# Patient Record
Sex: MALE | Race: BLACK OR AFRICAN AMERICAN | NOT HISPANIC OR LATINO | Employment: FULL TIME | URBAN - METROPOLITAN AREA
[De-identification: names, ages, dates, MRNs, and addresses within clinical notes are randomized per-mention and may not be internally consistent; named-entity substitution may affect disease eponyms.]

---

## 2018-01-05 ENCOUNTER — ALLSCRIPTS OFFICE VISIT (OUTPATIENT)
Dept: OTHER | Facility: OTHER | Age: 53
End: 2018-01-05

## 2018-01-05 DIAGNOSIS — M75.00 ADHESIVE CAPSULITIS OF SHOULDER: ICD-10-CM

## 2018-01-05 DIAGNOSIS — Z12.5 ENCOUNTER FOR SCREENING FOR MALIGNANT NEOPLASM OF PROSTATE: ICD-10-CM

## 2018-01-05 DIAGNOSIS — M25.519 PAIN IN SHOULDER: ICD-10-CM

## 2018-01-12 NOTE — MISCELLANEOUS
Provider Comments   Patient a no show for 02/08/16 OV; mailbox is full and not accepting messages at this time   C Ben        Signatures   Electronically signed by : Francisca Vyas DO; Feb 9 2016  5:06PM EST

## 2018-01-16 ENCOUNTER — TRANSCRIBE ORDERS (OUTPATIENT)
Dept: ADMINISTRATIVE | Facility: HOSPITAL | Age: 53
End: 2018-01-16

## 2018-01-16 ENCOUNTER — APPOINTMENT (OUTPATIENT)
Dept: RADIOLOGY | Facility: CLINIC | Age: 53
End: 2018-01-16
Payer: COMMERCIAL

## 2018-01-16 ENCOUNTER — ALLSCRIPTS OFFICE VISIT (OUTPATIENT)
Dept: OTHER | Facility: OTHER | Age: 53
End: 2018-01-16

## 2018-01-16 DIAGNOSIS — M25.519 PAIN IN SHOULDER: ICD-10-CM

## 2018-01-16 DIAGNOSIS — R29.898 DEFICIENCIES OF LIMBS: Primary | ICD-10-CM

## 2018-01-16 PROCEDURE — 73030 X-RAY EXAM OF SHOULDER: CPT

## 2018-01-17 NOTE — PROGRESS NOTES
Assessment   1  Shoulder weakness (719 61) (R26 552)    Plan   Shoulder pain    · * XR SHOULDER 2+ VIEW RIGHT; Status:Active; Requested GQW:[id number]; Shoulder weakness    · * MRI SHOULDER RIGHT WO CONTRAST; Status:Need Information - Financial    Authorization; Requested [id number];    · EMG ONE EXTREMITY WITH OR W/O RELATED PARASPINAL AREAS; Status:Hold For    - Scheduling; Requested EZR:[id number];   ONE : RUE   · Follow-up After MRI Evaluation and Treatment  Follow-up  Status: Hold For - Scheduling     Requested for: 35UMH1107    Discussion/Summary      Findings consistent with right shoulder weakness, possible suprascapular nerve injury versus rotator cuff tear  Discussed findings and treatment options with the patient  I recommended patient to have a EMG and MRI of his right shoulder to better assess his shoulder nerve function and rotator cuff  I recommend patient to continue passive shoulder range of motion as I have instructed  I will make further treatment recommendations after his EMG and MRI  All patient's questions were answered to his satisfaction  This note is created using dictation transcription  It may contains topographical errors, grammatical errors, improperly dictated words, background noise and other errors  Chief Complaint   Right shoulder weakness      History of Present Illness   75-year-old Afro-American male with new onset of right shoulder weakness  Patient's symptoms started early November 2017 while he was doing dead lifts in the gym  He feel discomfort over his right shoulder while he was doing a lift  He did not feel any difficulty moving his right arm after the incident but did have some aching discomfort  He thought that he may have pulled a muscle in his shoulder  The very next day, he noticed decreased strength in his right arm  He has not been able to do any lifting activities due to weakness  He denies pain radiating down his right arm   Information on patient's intake form was reviewed  Review of Systems        Constitutional: No fever or chills, feels well, no tiredness, no recent weight loss or weight gain  Eyes: No complaints of red eyes, no eyesight problems  ENT: no complaints of loss of hearing, no nosebleeds, no sore throat  Cardiovascular: No complaints of chest pain, no palpitations, no leg claudication or lower extremity edema  Respiratory: No complaints of shortness of breath, no wheezing, no cough  Gastrointestinal: No complaints of abdominal pain, no constipation, no nausea or vomiting, no diarrhea or bloody stools  Genitourinary: No complaints of dysuria or incontinence, no hesitancy, no nocturia  Musculoskeletal: as noted in HPI  Integumentary: No complaints of skin rash or lesion, no itching or dry skin, no skin wounds  Neurological: No complaints of headache, no confusion, no numbness or tingling, no dizziness  Psychiatric: No suicidal thoughts, no anxiety, no depression  Endocrine: No muscle weakness, no frequent urination, no excessive thirst, no feelings of weakness  ROS reviewed  Active Problems   1  Allergic rhinitis (477 9) (J30 9)   2  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)   3  Bilateral knee pain (719 46) (M25 561,M25 562)   4  Colon polyps (211 3) (K63 5)   5  Colonoscopy (Fiberoptic) Screening   6  Decreased libido (799 81) (R68 82)   7  Encounter for therapeutic drug monitoring (V58 83) (Z51 81)   8  Erectile dysfunction of non-organic origin (302 72) (F52 21)   9  Fatigue (780 79) (R53 83)   10  Frozen shoulder (726 0) (M75 00)   11  Herpes simplex type 1 infection (054 9) (B00 9)   12  Leukocytosis (288 60) (D72 829)   13  Long term use of drug (V58 69) (Z79 899)   14  Need for prophylactic vaccination and inoculation against influenza (V04 81) (Z23)   15  Prostate cancer screening (V76 44) (Z12 5)   16  Shoulder pain (719 41) (M25 519)   17   Vitamin D deficiency (268 9) (E55 9)   18  Weight loss, non-intentional (783 21) (R63 4)    Past Medical History      The active problems and past medical history were reviewed and updated today  Surgical History      The surgical history was reviewed and updated today  Family History   Mother    · Family history of Brain Cancer (V16 8)   · Family history of depression (V17 0) (Z81 8)   · Family history of Thyroid Cancer  Father    · Family history of Father  At Age ___     The family history was reviewed and updated today  Social History    · Alcohol Use (History)   · Social usage; a case of beer a week   · Drinks caffeinated tea   · Drinks 1-2 cups of hot tea a week   · Never a smoker   · No drug use   · Uses Safety Equipment - Seatbelts  The social history was reviewed and updated today  Current Meds    1  Acyclovir 800 MG Oral Tablet; TAKE 1 TABLET Every twelve hours; Therapy: 13Ddw5314 to (Evaluate:2014)  Requested for: 36TAY6255; Last     Rx:[id number] Ordered   2  Centrum TABS; TAKE 1 TABLET DAILY; Therapy: (Recorded:2014) to Recorded   3  Viagra 100 MG Oral Tablet; TAKE 1/2 TO 1 TABLET 2 HOURS BEFORE INTERCOURSE      USE NO MORE THAN ONCE DAILY; Therapy: 27TBT1331 to (Last Rx:[id number]) Ordered   4  Vitamin D3 5000 UNIT Oral Tablet; One tablet daily; Therapy: 71TIG4454 to (Last Rx:07Epm4324) Ordered     The medication list was reviewed and updated today  Allergies   1  Claritin TABS  2   Other    Vitals    Recorded: [id number] 01:34PM   Heart Rate 88   Systolic 959   Diastolic 78   Height 5 ft 10 in   Weight 171 lb    BMI Calculated 24 54   BSA Calculated 1 95     Physical Exam        Constitutional - General appearance: Normal       Musculoskeletal - Gait and station: Normal       Cardiovascular - Examination of extremities for edema and/or varicosities: Normal       Skin - Skin and subcutaneous tissue: Normal       Neurologic - Sensation: Normal -- Upper extremity peripheral neuro exam: Abnormal -- (Weak abduction and external rotation of the right shoulder  Weak forward flexion)  Psychiatric - Orientation to person, place, and time: Normal -- Mood and affect: Normal       Eyes      Conjunctiva and lids: Normal      Right Shoulder: Appearance: AC joint hypertrophy-- and-- Atrophy over infraspinatus and supraspinatus muscle, but-- no AC joint step-off,-- no deltoid atrophy,-- no trapezius atrophy,-- no belly of the biceps rachana deformity,-- no medial scapular winging,-- no lateral scapular winging-- and-- no shoulder swelling  Tenderness: scapula-- (Medial border), but-- not the [facility] joint,-- not the bicipital groove,-- not the coracoid,-- not the supraspinatus muscle,-- not the trapezial-- and-- not the greater tuberosity  Motor: 3/5 forward flexion,-- 3/5 abduction-- and-- 3/5 external rotation, but-- 5/5 adduction-- and-- 5/5 internal rotation  Forward flexion: painless restricted AROM 130 degrees  Abduction: painless restricted AROM 110 degrees  Full passive range motion  Special Tests: positive Drop Arm test, but-- negative Painful Arc,-- negative Sulcus sign-- and-- negative Cross Body Adduction test       Results/Data   I personally reviewed the films/images/results in the office today  My interpretation follows  X-ray Review Right shoulder show mild acromioclavicular and glenohumeral joint osteoarthritis  No soft tissue calcification  Type 2 acromion process        Future Appointments      Date/Time Provider Specialty Site   02/23/2018 09:20 AM Dina Bingham MD Gastroenterology Adult [facility] 9     Signatures    Electronically signed by : Dacia Matthews MD; Jan 16 2018  2:22PM EST                       (Author)

## 2018-01-23 VITALS
DIASTOLIC BLOOD PRESSURE: 78 MMHG | SYSTOLIC BLOOD PRESSURE: 126 MMHG | BODY MASS INDEX: 24.48 KG/M2 | HEART RATE: 88 BPM | HEIGHT: 70 IN | WEIGHT: 171 LBS

## 2018-01-23 VITALS
HEIGHT: 70 IN | BODY MASS INDEX: 24.67 KG/M2 | WEIGHT: 172.3 LBS | SYSTOLIC BLOOD PRESSURE: 122 MMHG | RESPIRATION RATE: 16 BRPM | HEART RATE: 72 BPM | DIASTOLIC BLOOD PRESSURE: 80 MMHG

## 2018-01-31 VITALS
SYSTOLIC BLOOD PRESSURE: 118 MMHG | HEART RATE: 82 BPM | WEIGHT: 175 LBS | BODY MASS INDEX: 25.05 KG/M2 | HEIGHT: 70 IN | DIASTOLIC BLOOD PRESSURE: 72 MMHG

## 2018-01-31 DIAGNOSIS — M75.111 PARTIAL TEAR OF RIGHT ROTATOR CUFF: Primary | ICD-10-CM

## 2018-01-31 PROCEDURE — 99213 OFFICE O/P EST LOW 20 MIN: CPT | Performed by: ORTHOPAEDIC SURGERY

## 2018-01-31 RX ORDER — SILDENAFIL 100 MG/1
TABLET, FILM COATED ORAL
COMMUNITY
Start: 2014-02-24

## 2018-01-31 RX ORDER — MULTIVITAMIN/IRON/FOLIC ACID 18MG-0.4MG
1 TABLET ORAL DAILY
COMMUNITY

## 2018-01-31 RX ORDER — MAG HYDROX/ALUMINUM HYD/SIMETH 400-400-40
1 SUSPENSION, ORAL (FINAL DOSE FORM) ORAL DAILY
Status: ON HOLD | COMMUNITY
Start: 2013-09-06 | End: 2018-04-13

## 2018-01-31 RX ORDER — ACYCLOVIR 800 MG/1
1 TABLET ORAL EVERY 12 HOURS
COMMUNITY
Start: 2012-02-20 | End: 2018-03-19 | Stop reason: SDUPTHER

## 2018-01-31 RX ORDER — FLUTICASONE PROPIONATE 50 MCG
1 SPRAY, SUSPENSION (ML) NASAL 2 TIMES DAILY
COMMUNITY
Start: 2013-11-08 | End: 2019-05-06 | Stop reason: HOSPADM

## 2018-01-31 NOTE — ASSESSMENT & PLAN NOTE
Findings consistent with right shoulder partial rotator cuff tear  Discussed findings and treatment options with the patient  I think patient's symptom is most likely due to overuse from weight lifting  I recommend patient to attend physical therapy to rehabilitate his right shoulder  I discussed with patient possible surgical treatment if his symptoms persist   I would like to re-evaluate patient in 6-8 weeks  Patient should avoid heavy lifting activities especially overhead  All patient's questions were answered to his satisfaction  This note is created using dictation transcription  It may contains topographical errors, grammatical errors, improperly dictated words, background noise and other errors

## 2018-01-31 NOTE — PROGRESS NOTES
Assessment:     1  Partial tear of right rotator cuff        Plan:     Problem List Items Addressed This Visit        Musculoskeletal and Integument    Partial tear of right rotator cuff - Primary     Findings consistent with right shoulder partial rotator cuff tear  Discussed findings and treatment options with the patient  I think patient's symptom is most likely due to overuse from weight lifting  I recommend patient to attend physical therapy to rehabilitate his right shoulder  I discussed with patient possible surgical treatment if his symptoms persist   I would like to re-evaluate patient in 6-8 weeks  Patient should avoid heavy lifting activities especially overhead  All patient's questions were answered to his satisfaction  This note is created using dictation transcription  It may contains topographical errors, grammatical errors, improperly dictated words, background noise and other errors  Relevant Orders    Ambulatory referral to Physical Therapy         Subjective:     Patient ID: Susi Martin is a 45 y o  male  Chief Complaint:  47-year-old gentleman returns today follow-up right shoulder pain and weakness  Patient returns today after the MRI of his right shoulder  He has been doing some light exercises and feels that the shoulder pain is less he also feels the strength is returning  He has been able to lift light weight below the shoulder level  He is able to actively moving his right shoulder without significant pain  Patient is returned today to review his right shoulder MRI  Allergy:  Allergies   Allergen Reactions    Other Hives     Category: Shrimp; Medications:  all current active meds have been reviewed  Past Medical History:  History reviewed  No pertinent past medical history  Past Surgical History:  History reviewed  No pertinent surgical history    Family History:  Family History   Problem Relation Age of Onset    Thyroid cancer Mother      Social History:  History   Alcohol Use    Yes     History   Drug Use No     History   Smoking Status    Never Smoker   Smokeless Tobacco    Never Used     Review of Systems   Constitutional: Negative  HENT: Negative  Eyes: Negative  Respiratory: Negative  Cardiovascular: Negative  Gastrointestinal: Negative  Endocrine: Negative  Genitourinary: Negative  Musculoskeletal: Positive for arthralgias  Skin: Negative  Neurological: Negative  Hematological: Negative  Psychiatric/Behavioral: Negative  Objective:  BP Readings from Last 1 Encounters:   01/31/18 118/72      Wt Readings from Last 1 Encounters:   01/31/18 79 4 kg (175 lb)      BMI:   Estimated body mass index is 25 11 kg/m² as calculated from the following:    Height as of this encounter: 5' 10" (1 778 m)  Weight as of this encounter: 79 4 kg (175 lb)  BSA:   Estimated body surface area is 1 97 meters squared as calculated from the following:    Height as of this encounter: 5' 10" (1 778 m)  Weight as of this encounter: 79 4 kg (175 lb)  Physical Exam   Constitutional: He is oriented to person, place, and time  He appears well-developed  HENT:   Head: Normocephalic and atraumatic  Eyes: EOM are normal  Pupils are equal, round, and reactive to light  Neck: Neck supple  Neurological: He is alert and oriented to person, place, and time  Skin: Skin is warm  Psychiatric: He has a normal mood and affect  Nursing note and vitals reviewed  Right Shoulder Exam     Tenderness   The patient is experiencing no tenderness  Range of Motion   The patient has normal right shoulder ROM  Muscle Strength   The patient has normal right shoulder strength    Abduction: 4/5   Internal Rotation: 5/5   External Rotation: 4/5   Supraspinatus: 4/5   Subscapularis: 5/5   Biceps: 5/5     Tests   Apprehension: negative  Cross Arm: negative  Drop Arm: negative  Hawkin's test: positive  Impingement: negative  Sulcus: absent    Other   Erythema: absent  Sensation: normal  Pulse: present            Patient's right shoulder MRI on a disc was reviewed  Patient appeared to have a partial rotator cuff tear in the infra and supraspinatus tendon there is also a inferior anterior labrum cyst  acromioclavicular joint osteoarthritis

## 2018-02-07 ENCOUNTER — APPOINTMENT (OUTPATIENT)
Dept: PHYSICAL THERAPY | Facility: CLINIC | Age: 53
End: 2018-02-07
Payer: COMMERCIAL

## 2018-02-12 ENCOUNTER — EVALUATION (OUTPATIENT)
Dept: PHYSICAL THERAPY | Facility: CLINIC | Age: 53
End: 2018-02-12
Payer: COMMERCIAL

## 2018-02-12 DIAGNOSIS — M75.111 PARTIAL TEAR OF RIGHT ROTATOR CUFF: Primary | ICD-10-CM

## 2018-02-12 PROCEDURE — G8984 CARRY CURRENT STATUS: HCPCS

## 2018-02-12 PROCEDURE — G8985 CARRY GOAL STATUS: HCPCS

## 2018-02-12 PROCEDURE — 97161 PT EVAL LOW COMPLEX 20 MIN: CPT

## 2018-02-12 PROCEDURE — 97140 MANUAL THERAPY 1/> REGIONS: CPT

## 2018-02-12 NOTE — PROGRESS NOTES
PT Evaluation     Today's date: 2018  Patient name: Penny Arias  : 1965  MRN: [id number]  Referring provider: Asher Barkley MD  Dx:   Encounter Diagnosis   Name Primary?  Partial tear of right rotator cuff Yes                  Assessment  Impairments: abnormal or restricted ROM and impaired physical strength  Functional limitations: limimted gym routine  Assessment details: Pt is a 52year old LHD male who presents to PT with diagnosis of R shoulder partial RC tear (supra/infra)  He presents with, no focal tenderness, normal range of motion with exception of Ir, moderate posterior capsule tightness, minor strength deficits in R shoulder and periscapular muscles, and decreased functioning with his normal gym routine  He will benefit from skilled PT to help him regain his full mobility, full strength, educate him on proper lifting techniques and stabilization for his upper extremities, and help him return to his normal gym program without restrictions  Thank you kindly for your referral    Understanding of Dx/Px/POC: excellentPrognosis details: Pt has minimal pain symptoms with functional motion and minor strength deficits    Goals  ST: Increase ROM to full ranges in 3-4 weeks  2:  Increase strength to 4+ to 5/5 throughout in 3-4 weeks  3: Pt pain free in all directions  4: Decreased capsular tightness by 50% in 4 weeks  LT: Improve FOTO 10-15 pts in 4-6 weeks  2: Pt able to perform all gym activities without restrictions  3: Pt independent with HEP    Plan  Patient would benefit from: skilled PT  Planned modality interventions: cryotherapy  Planned therapy interventions: neuromuscular re-education, patient education, strengthening, stretching, therapeutic exercise, therapeutic activities, manual therapy and joint mobilization  Frequency: 1x week  Duration in visits: 6  Duration in weeks: 6  Treatment plan discussed with: patient  Plan details: Initiate PT as per POC        Subjective Evaluation    History of Present Illness  Date of onset: 2017  Mechanism of injury: 2017- dead lifts in gym, reps of 8, 6th or 7th rep felt a pull in hamstring on L side and pull on R upper back, leg healed up and R shoulder didn't resolve  Previous football player, L shoulder  LHD  Held off from gym for awhile, recently went back to help strengthen  MRI- partially tear  Pain in posterior cuff when initially injured      Not a recurrent problem Quality of life: good    Pain  No pain reported  Current pain ratin  At best pain ratin  At worst pain ratin  Progression: improved    Social Support  Lives in: apartment  Lives with: alone    Employment status: working ([employer])  Hand dominance: left  Exercise comments: gym 4-5xdays week        Diagnostic Tests  X-ray: normal  MRI studies: abnormal (partial infra-supraspinatus tear, Ac jt arthritis)  Patient Goals  Patient goals for therapy: increased strength and return to sport/leisure activities          Objective     Active Range of Motion   Left Shoulder   Flexion: 145 degrees   Abduction: 160 degrees   External rotation BTH: T1   Internal rotation BTB: T7     Right Shoulder   Flexion: 143 degrees   Abduction: 155 degrees   External rotation BTH: C7   Internal rotation BTB: T12 with pain    Additional Active Range of Motion Details  No localized tenderness to palpation  Pain only with active IR behind back      Passive Range of Motion     Right Shoulder   Flexion: 150 degrees   Abduction: 160 degrees   External rotation 90°: 75 degrees   Internal rotation 45°: 40 degrees     Additional Passive Range of Motion Details  Posterior capsule tightness, firm end feel at IR end range    Strength/Myotome Testing     Left Shoulder     Planes of Motion   Flexion: 5   Extension: 5   Abduction: 5   External rotation at 0°: 4+   Internal rotation at 0°: 5   Horizontal abduction: 4     Isolated Muscles   Biceps: 5   Lower trapezius: 4 Rhomboids: 4+   Serratus anterior: 4+     Right Shoulder     Planes of Motion   Flexion: 4+   Extension: 4+   Abduction: 4   External rotation at 0°: 4+   Internal rotation at 0°: 5   Horizontal abduction: 4     Isolated Muscles   Biceps: 5   Lower trapezius: 4   Rhomboids: 4   Serratus anterior: 4+       Precautions: none    Daily Treatment Diary     Manual              R shoulder PROM             Posterior mobs             Post capsule stretch             Rhythmic stabs                              Exercise Diary              Arm bike             SA punches             SL ER             Prone ext             Prone T             Prone Y             Standing scaption             bosu ball push ups             Body blade             CC lat pull down                                                                                                                                                   Modalities              CP 10 min PRN                                           HEP: RC strengthening, periscapular strengthening; wall slides, serratus punches, scaption raises

## 2018-02-14 ENCOUNTER — APPOINTMENT (OUTPATIENT)
Dept: PHYSICAL THERAPY | Facility: CLINIC | Age: 53
End: 2018-02-14
Payer: COMMERCIAL

## 2018-02-19 ENCOUNTER — TRANSCRIBE ORDERS (OUTPATIENT)
Dept: GASTROENTEROLOGY | Facility: MEDICAL CENTER | Age: 53
End: 2018-02-19

## 2018-02-19 ENCOUNTER — OFFICE VISIT (OUTPATIENT)
Dept: PHYSICAL THERAPY | Facility: CLINIC | Age: 53
End: 2018-02-19
Payer: COMMERCIAL

## 2018-02-19 DIAGNOSIS — M75.111 PARTIAL TEAR OF RIGHT ROTATOR CUFF: Primary | ICD-10-CM

## 2018-02-19 PROCEDURE — 97112 NEUROMUSCULAR REEDUCATION: CPT

## 2018-02-19 PROCEDURE — 97140 MANUAL THERAPY 1/> REGIONS: CPT

## 2018-02-19 PROCEDURE — 97110 THERAPEUTIC EXERCISES: CPT

## 2018-02-19 NOTE — PROGRESS NOTES
Daily Note     Today's date: 2018  Patient name: Harini Mclean  : 1965  MRN: [id number]  Referring provider: Adolph Sanchez MD  Dx:   Encounter Diagnosis     ICD-10-CM    1  Partial tear of right rotator cuff M75 111                   Subjective: Shoulder is feeling pretty good, no c/o pain  Doing exercises regularly at gym  Able to do press-up with bench press bar no more  Unable to tolerate push-ups in full prone position, able to perform in incline      Objective: See treatment diary below  Daily Treatment Diary     Manual              R shoulder PROM 5            Posterior mobs 5            Post capsule stretch             Rhythmic stabs 5             15                Exercise Diary              Arm bike 3/3min            SA punches 5# 3x10            SL ER 5# 3x10            Prone ext 7 5# 3x10            Prone T 4# 3x10            Prone Y 4# 3x10            Standing scaption 5# 3x10            bosu ball push ups NP            Body blade 90/90 neuro 1 min x2            CC lat pull down with DLS neuro 08# 0A80                                                                                                                                  22/8                Modalities              CP 10 min PRN NP                                            Assessment: Tolerated treatment well  Patient exhibited good technique with therapeutic exercises  Initiated TE program, pt tolerated exercises well  Pt performed body blade and CC for core activation with sh ext for neuro re-education  Pt may only need another 2-3 visits  Plan: Continue per plan of care

## 2018-02-21 ENCOUNTER — APPOINTMENT (OUTPATIENT)
Dept: PHYSICAL THERAPY | Facility: CLINIC | Age: 53
End: 2018-02-21
Payer: COMMERCIAL

## 2018-02-23 ENCOUNTER — OFFICE VISIT (OUTPATIENT)
Dept: GASTROENTEROLOGY | Facility: MEDICAL CENTER | Age: 53
End: 2018-02-23
Payer: COMMERCIAL

## 2018-02-23 VITALS
HEIGHT: 70 IN | HEART RATE: 71 BPM | WEIGHT: 173 LBS | TEMPERATURE: 97 F | SYSTOLIC BLOOD PRESSURE: 116 MMHG | DIASTOLIC BLOOD PRESSURE: 72 MMHG | BODY MASS INDEX: 24.77 KG/M2

## 2018-02-23 DIAGNOSIS — Z86.010 HISTORY OF COLON POLYPS: Primary | ICD-10-CM

## 2018-02-23 PROCEDURE — 99243 OFF/OP CNSLTJ NEW/EST LOW 30: CPT | Performed by: INTERNAL MEDICINE

## 2018-02-23 NOTE — PROGRESS NOTES
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 -  His last colonoscopy was done on 9/20/13  Follow-up colonoscopy was recommended to be done six months after the initial colonoscopy, however he deferred until now  He is asymptomatic but a repeat colonoscopy is necessary at this time  I will schedule patient for colonoscopy  Risks/benefits and alternate screening options discussed  Risks and benefits include, but are not limited to, bleeding, perforation, missed lesion  Bowl prep instructions given       ______________________________________________________________________    HPI:  Candelaria Birmingham is a 52year old male who presents today to plan for colonoscopy to follow-up on tubulovillous polyp found on colonoscopy in 2013  His last colonoscopy and EGD was performed in 2013 by Tala Sarmiento  EGD 9/20/13 was normal  Colonoscopy on 9/20/13 showed tubulovillous polyp features with focal high grade dysplasia  A repeat colonoscopy was recommended done be six months after colonoscopy in 2013 but he has not had this completed  Denies any nausea, vomiting, diarrhea, constipation, blood in stool, and weight loss  Family history of cancer includes thyroid (mother) and breast (sister)  REVIEW OF SYSTEMS:    CONSTITUTIONAL: Denies any fever, chills, rigors, and weight loss  HEENT: No earache or tinnitus  Denies hearing loss or visual disturbances  CARDIOVASCULAR: No chest pain or palpitations  RESPIRATORY: Denies any cough, hemoptysis, shortness of breath or dyspnea on exertion  GASTROINTESTINAL: As noted in the History of Present Illness  GENITOURINARY: No problems with urination  Denies any hematuria or dysuria  NEUROLOGIC: No dizziness or vertigo, denies headaches     MUSCULOSKELETAL: Denies any muscle or joint pain    SKIN: Denies skin rashes or itching  ENDOCRINE: Denies excessive thirst  Denies intolerance to heat or cold  PSYCHOSOCIAL: Denies depression or anxiety  Denies any recent memory loss  Historical Information   No past medical history on file  No past surgical history on file  Social History   History   Alcohol Use    Yes     History   Drug Use No     History   Smoking Status    Never Smoker   Smokeless Tobacco    Never Used     Family History   Problem Relation Age of Onset    Thyroid cancer Mother        Meds/Allergies       Current Outpatient Prescriptions:     acyclovir (ZOVIRAX) 800 mg tablet    Cholecalciferol (VITAMIN D3) 5000 units CAPS    fluticasone (FLONASE) 50 mcg/act nasal spray    multivitamin-minerals (CENTRUM ADULTS) tablet    sildenafil (VIAGRA) 100 mg tablet    Allergies   Allergen Reactions    Other Hives     Category: Shrimp;            Objective     Blood pressure 116/72, pulse 71, temperature (!) 97 °F (36 1 °C), temperature source Tympanic, height 5' 10" (1 778 m), weight 78 5 kg (173 lb)  PHYSICAL EXAM:      General Appearance:   Alert, cooperative, no distress   HEENT:   Normocephalic, atraumatic, anicteric      Neck:  Supple, symmetrical, trachea midline   Lungs:   Clear to auscultation bilaterally; no rales, rhonchi or wheezing; respirations unlabored    Heart[de-identified]   Regular rate and rhythm; no murmur, rub, or gallop  Abdomen:   Soft, non-tender, non-distended; normal bowel sounds; no masses, no organomegaly    Genitalia:   Deferred    Rectal:   Deferred    Extremities:  No cyanosis, clubbing or edema    Pulses:  2+ and symmetric    Skin:  No jaundice, rashes, or lesions    Lymph nodes:  No palpable cervical lymphadenopathy        Lab Results:   No visits with results within 1 Day(s) from this visit     Latest known visit with results is:   Conversion Encounter Outpatient on 12/28/2015   Component Date Value    WBC 12/28/2015 3 89*    RBC 12/28/2015 4 31  Hemoglobin 12/28/2015 13 0     Hematocrit 12/28/2015 39 5     MCV 12/28/2015 92     MCH 12/28/2015 30 2     MCHC 12/28/2015 32 9     RDW 12/28/2015 13 6     MPV 12/28/2015 10 6     Platelets 81/63/2132 281     nRBC 12/28/2015 0     Neutrophils Relative 12/28/2015 39*    Lymphocytes Relative 12/28/2015 32     Monocytes Relative 12/28/2015 9     Eosinophils Relative 12/28/2015 19*    Basophils Relative 12/28/2015 1     Neutrophils Absolute 12/28/2015 1 52*    Lymphocytes Absolute 12/28/2015 1 24     Monocytes Absolute 12/28/2015 0 35     Eosinophils Absolute 12/28/2015 0 74*    Basophils Absolute 12/28/2015 0 04     Vit D, 25-Hydroxy 12/28/2015 <4 2*    PSA 12/28/2015 0 8     TSH 3RD GENERATON 12/28/2015 3 370     Sodium 12/28/2015 142     Potassium 12/28/2015 4 0     Chloride 12/28/2015 108     CO2 12/28/2015 26     Anion Gap 12/28/2015 8     Total Bilirubin 12/28/2015 0 36     Total Protein 12/28/2015 7 6     Alkaline Phosphatase 12/28/2015 55     ALT 12/28/2015 31     AST 12/28/2015 22     Glucose 12/28/2015 103     Albumin 12/28/2015 3 5     BUN 12/28/2015 16     Calcium 12/28/2015 8 7     Creatinine 12/28/2015 0 98     AAGFR 12/28/2015 >60     NAAGFR 12/28/2015 >60          By signing my name below, I, Carla Bullard, attest that this documentation has been prepared under the direction and in the presence of Daniel Tineo MD  Electonically Signed: Carla Bullard, [address] 2/23/2018      I, Daniel Tineo, personally performed the services described in this documenation  All medical record entries made by the scribe were at my direction and in my presence  I have reviewed the chart and discharge instructions (if applicable) and agree that the record reflects my personal performance and is accurate and complete  Daniel Tineo MD  2/23/2018

## 2018-02-23 NOTE — LETTER
February 23, 2018     Alexis Bradshaw DO  [address] [address]    Patient: Paty Zaldivar   YOB: 1965   Date of Visit: 2/23/2018       Dear Dr Kaplan Head: Thank you for referring Shira Jiménez to me for evaluation  Below are my notes for this consultation  If you have questions, please do not hesitate to call me  I look forward to following your patient along with you           Sincerely,        Daniel Tineo MD        CC: No Recipients

## 2018-02-26 ENCOUNTER — OFFICE VISIT (OUTPATIENT)
Dept: PHYSICAL THERAPY | Facility: CLINIC | Age: 53
End: 2018-02-26
Payer: COMMERCIAL

## 2018-02-26 DIAGNOSIS — M75.111 PARTIAL TEAR OF RIGHT ROTATOR CUFF: Primary | ICD-10-CM

## 2018-02-26 PROCEDURE — 97110 THERAPEUTIC EXERCISES: CPT

## 2018-02-26 PROCEDURE — 97112 NEUROMUSCULAR REEDUCATION: CPT

## 2018-02-26 PROCEDURE — 97140 MANUAL THERAPY 1/> REGIONS: CPT

## 2018-02-26 NOTE — PROGRESS NOTES
Daily Note     Today's date: 2018  Patient name: Harini Mclean  : 1965  MRN: [id number]  Referring provider: Adolph Sanchez MD  Dx:   Encounter Diagnosis     ICD-10-CM    1  Partial tear of right rotator cuff M75 111                   Subjective: Pt reported he was able to perform 10 push-ups this morning, shoulder is feeling much better  Objective: See treatment diary below  Daily Treatment Diary     Manual             R shoulder PROM 5 5           Posterior mobs 5            Post capsule stretch             Rhythmic stabs 5 5            15 10               Exercise Diary             Arm bike 3/3min 3/3min           SA punches/Figure 8 5# 3x10 5# 3x10 each           SL ER 5# 3x10 5# 3x10           Prone ext 7 5# 3x10 7 5# 3x10           Prone T 4# 3x10 5# 3x10           Prone Y 4# 3x10 5# 3x10           Standing scaption 5# 3x10 5# 3x10           bosu ball push ups NP 20x scap protraction           Body blade 90/90 neuro 1 min x2 30"x3           CC lat pull down with DLS neuro 84# 6X05 14# 3x10                        NP                                                                                                         22/8 15/8               Modalities              CP 10 min PRN NP NP                            Assessment: Tolerated treatment well  Patient exhibited good technique with therapeutic exercises   Rhythmic stabs- only weakness seen in sh flexors  Pt tolerated exercises well  No complaints of soreness or pain after exercises  Possible D/C NV      Plan: Continue per plan of care

## 2018-02-28 ENCOUNTER — APPOINTMENT (OUTPATIENT)
Dept: PHYSICAL THERAPY | Facility: CLINIC | Age: 53
End: 2018-02-28
Payer: COMMERCIAL

## 2018-03-05 ENCOUNTER — OFFICE VISIT (OUTPATIENT)
Dept: PHYSICAL THERAPY | Facility: CLINIC | Age: 53
End: 2018-03-05
Payer: COMMERCIAL

## 2018-03-05 DIAGNOSIS — M75.111 PARTIAL TEAR OF RIGHT ROTATOR CUFF: Primary | ICD-10-CM

## 2018-03-05 PROCEDURE — 97112 NEUROMUSCULAR REEDUCATION: CPT

## 2018-03-05 PROCEDURE — 97140 MANUAL THERAPY 1/> REGIONS: CPT

## 2018-03-05 PROCEDURE — 97110 THERAPEUTIC EXERCISES: CPT

## 2018-03-05 NOTE — PROGRESS NOTES
Daily Note/Discharge    Today's date: 3/19/2018  Patient name: Elmo Brooks  : 1965  MRN: [id number]  Referring provider: Suleiman Rg MD  Dx:   Encounter Diagnosis     ICD-10-CM    1  Partial tear of right rotator cuff M75 111        Start Time: 1535  Stop Time: 1615Pt was unable to make last two appointments  Spoke with him on phone, Pt mentioned his shoulder was feeling much better and he was doing well with his home program  Recommended D/C to [facility] at this time, last appointment was to review exercises and reassess but Pt had been progressing well throughout his sessions with very minor weakness  ST: Increase ROM to full ranges in 3-4 weeks- met  2: Increase strength to 4+ to 5/5 throughout in 3-4 weeks- met  3: Pt pain free in all directions- met  4: Decreased capsular tightness by 50% in 4 weeks- met  LT: Improve FOTO 10-15 pts in 4-6 weeks- not assessed  2: Pt able to perform all gym activities without restrictions- met  3: Pt independent with [facility]- met  Total time in clinic (min): 40 minutes    Subjective: No pain with R shoulder, more weakness but its coming along        Objective: See treatment diary below  Daily Treatment Diary     Manual   3/5          R shoulder PROM 5 5 5          Posterior mobs 5            Post capsule stretch             Rhythmic stabs 5 5 5           15 10 10              Exercise Diary   3/5          Arm bike 3/3min 3/3min 3/3min          SA punches/Figure 8 5# 3x10 5# 3x10 each 5# 3x10          SL ER 5# 3x10 5# 3x10 5# 3x10          Prone ext 7 5# 3x10 7 5# 3x10 7 5 # 3x10          Prone T 4# 3x10 5# 3x10 5# 3x10          Prone Y 4# 3x10 5# 3x10 5# 3x10          Standing scaption 5# 3x10 5# 3x10 NP          bosu ball push ups NP 20x scap protraction 20x          Body blade 90/90 neuro 1 min x2 30"x3 30"x3          CC lat pull down with DLS neuro 38# 3O79 05# 3x10 45# 3x10 22/8 15/8 22/8              Modalities              CP 10 min PRN NP NP                              Assessment: Tolerated treatment well  Patient exhibited good technique with therapeutic exercises Pt c/o muscle burn and fatigue during exercises but was able to complete  Pt still apprehensive on condition of R shoulder  One more visit then D/C to [facility]  Plan: Continue per plan of care  Potential discharge next visit

## 2018-03-07 ENCOUNTER — APPOINTMENT (OUTPATIENT)
Dept: PHYSICAL THERAPY | Facility: CLINIC | Age: 53
End: 2018-03-07
Payer: COMMERCIAL

## 2018-03-12 ENCOUNTER — APPOINTMENT (OUTPATIENT)
Dept: PHYSICAL THERAPY | Facility: CLINIC | Age: 53
End: 2018-03-12
Payer: COMMERCIAL

## 2018-03-14 ENCOUNTER — APPOINTMENT (OUTPATIENT)
Dept: PHYSICAL THERAPY | Facility: CLINIC | Age: 53
End: 2018-03-14
Payer: COMMERCIAL

## 2018-03-19 ENCOUNTER — APPOINTMENT (OUTPATIENT)
Dept: PHYSICAL THERAPY | Facility: CLINIC | Age: 53
End: 2018-03-19
Payer: COMMERCIAL

## 2018-03-19 ENCOUNTER — TELEPHONE (OUTPATIENT)
Dept: FAMILY MEDICINE CLINIC | Facility: CLINIC | Age: 53
End: 2018-03-19

## 2018-03-19 DIAGNOSIS — B00.9 HSV INFECTION: Primary | ICD-10-CM

## 2018-03-19 PROCEDURE — G8985 CARRY GOAL STATUS: HCPCS

## 2018-03-19 PROCEDURE — G8986 CARRY D/C STATUS: HCPCS

## 2018-03-19 RX ORDER — ACYCLOVIR 800 MG/1
TABLET ORAL
Qty: 30 TABLET | Refills: 0 | Status: SHIPPED | OUTPATIENT
Start: 2018-03-19 | End: 2019-05-06 | Stop reason: HOSPADM

## 2018-04-12 ENCOUNTER — TELEPHONE (OUTPATIENT)
Dept: GASTROENTEROLOGY | Facility: CLINIC | Age: 53
End: 2018-04-12

## 2018-04-12 NOTE — TELEPHONE ENCOUNTER
Pt left message on vm stating castro-prep wasn't sent to pharmacy   I left detailed message it was sent in Feb

## 2018-04-13 ENCOUNTER — HOSPITAL ENCOUNTER (OUTPATIENT)
Facility: HOSPITAL | Age: 53
Setting detail: OUTPATIENT SURGERY
Discharge: HOME | End: 2018-04-13
Attending: INTERNAL MEDICINE | Admitting: INTERNAL MEDICINE
Payer: COMMERCIAL

## 2018-04-13 ENCOUNTER — ANESTHESIA EVENT (OUTPATIENT)
Dept: GASTROENTEROLOGY | Facility: HOSPITAL | Age: 53
End: 2018-04-13
Payer: COMMERCIAL

## 2018-04-13 ENCOUNTER — ANESTHESIA (OUTPATIENT)
Dept: GASTROENTEROLOGY | Facility: HOSPITAL | Age: 53
End: 2018-04-13
Payer: COMMERCIAL

## 2018-04-13 VITALS
TEMPERATURE: 98.9 F | DIASTOLIC BLOOD PRESSURE: 80 MMHG | RESPIRATION RATE: 20 BRPM | HEART RATE: 50 BPM | SYSTOLIC BLOOD PRESSURE: 140 MMHG | OXYGEN SATURATION: 99 % | HEIGHT: 70 IN | BODY MASS INDEX: 25.2 KG/M2 | WEIGHT: 176 LBS

## 2018-04-13 DIAGNOSIS — Z86.010 HISTORY OF COLON POLYPS: ICD-10-CM

## 2018-04-13 PROCEDURE — 88342 IMHCHEM/IMCYTCHM 1ST ANTB: CPT | Performed by: PATHOLOGY

## 2018-04-13 PROCEDURE — 88305 TISSUE EXAM BY PATHOLOGIST: CPT | Performed by: PATHOLOGY

## 2018-04-13 PROCEDURE — 45380 COLONOSCOPY AND BIOPSY: CPT | Performed by: INTERNAL MEDICINE

## 2018-04-13 PROCEDURE — 88341 IMHCHEM/IMCYTCHM EA ADD ANTB: CPT | Performed by: PATHOLOGY

## 2018-04-13 RX ORDER — PROPOFOL 10 MG/ML
INJECTION, EMULSION INTRAVENOUS AS NEEDED
Status: DISCONTINUED | OUTPATIENT
Start: 2018-04-13 | End: 2018-04-13 | Stop reason: SURG

## 2018-04-13 RX ORDER — LIDOCAINE HYDROCHLORIDE 10 MG/ML
INJECTION, SOLUTION INFILTRATION; PERINEURAL AS NEEDED
Status: DISCONTINUED | OUTPATIENT
Start: 2018-04-13 | End: 2018-04-13 | Stop reason: SURG

## 2018-04-13 RX ORDER — SODIUM CHLORIDE 9 MG/ML
50 INJECTION, SOLUTION INTRAVENOUS CONTINUOUS
Status: DISCONTINUED | OUTPATIENT
Start: 2018-04-13 | End: 2018-04-19 | Stop reason: HOSPADM

## 2018-04-13 RX ORDER — PROPOFOL 10 MG/ML
INJECTION, EMULSION INTRAVENOUS CONTINUOUS PRN
Status: DISCONTINUED | OUTPATIENT
Start: 2018-04-13 | End: 2018-04-13 | Stop reason: SURG

## 2018-04-13 RX ADMIN — PROPOFOL 100 MG: 10 INJECTION, EMULSION INTRAVENOUS at 13:00

## 2018-04-13 RX ADMIN — PROPOFOL 30 MG: 10 INJECTION, EMULSION INTRAVENOUS at 13:03

## 2018-04-13 RX ADMIN — PROPOFOL 120 MCG/KG/MIN: 10 INJECTION, EMULSION INTRAVENOUS at 13:04

## 2018-04-13 RX ADMIN — LIDOCAINE HYDROCHLORIDE 20 MG: 10 INJECTION, SOLUTION INFILTRATION; PERINEURAL at 13:00

## 2018-04-13 RX ADMIN — SODIUM CHLORIDE 50 ML/HR: 0.9 INJECTION, SOLUTION INTRAVENOUS at 12:27

## 2018-04-13 RX ADMIN — PROPOFOL 30 MG: 10 INJECTION, EMULSION INTRAVENOUS at 13:01

## 2018-04-13 NOTE — H&P
History and Physical - [facility]  [facility] Marline Guider 52 y o  male MRN: [id number]    HPI: Ortiz Hassan is a 52y o  year old male who presents for surveillance colonoscopy  He has personal history of colon polyp  Last colonoscopy was in 2013  Review of Systems    Historical Information   Past Medical History:   Diagnosis Date    History of colon polyps      Past Surgical History:   Procedure Laterality Date    COLONOSCOPY       Social History   History   Alcohol Use    Yes     History   Drug Use No     History   Smoking Status    Never Smoker   Smokeless Tobacco    Never Used     Family History   Problem Relation Age of Onset    Thyroid cancer Mother        Meds/Allergies     Prescriptions Prior to Admission   Medication    acyclovir (ZOVIRAX) 800 mg tablet    multivitamin-minerals (CENTRUM ADULTS) tablet    Na Sulfate-K Sulfate-Mg Sulf (SUPREP BOWEL PREP KIT) 17 5-3 13-1 6 GM/180ML SOLN    fluticasone (FLONASE) 50 mcg/act nasal spray    sildenafil (VIAGRA) 100 mg tablet       Allergies   Allergen Reactions    Other Hives     Category: Shrimp;        Objective     Blood pressure 127/78, pulse 55, temperature 98 9 °F (37 2 °C), temperature source Temporal, resp  rate 17, height 5' 10" (1 778 m), weight 79 8 kg (176 lb), SpO2 97 %  PHYSICAL EXAM    Gen: NAD  CV: RRR  CHEST: Clear  ABD: soft, NT/ND  EXT: no edema  Neuro: AAO      ASSESSMENT/PLAN:  This is a 52y o  year old male here for surveillance colonoscopy  He had polyp with focal high-grade dysplasia in 2013  He is overdue for surveillance colonoscopy      PLAN:   Procedure:  Colonoscopy

## 2018-04-13 NOTE — ANESTHESIA PREPROCEDURE EVALUATION
Review of Systems/Medical History  Patient summary reviewed  Chart reviewed  No history of anesthetic complications     Cardiovascular  Exercise tolerance: good,  No Hyperlipidemia, No hypertension , No CAD, , No cardiac stents     Pulmonary  Negative pulmonary ROS No asthma: , No recent URI ,        GI/Hepatic            Endo/Other     GYN       Hematology   Musculoskeletal       Neurology   Psychology         Lab Results   Component Value Date    WBC 3 89 (L) 12/28/2015    HGB 13 0 12/28/2015     12/28/2015     Lab Results   Component Value Date     12/28/2015    K 4 0 12/28/2015    BUN 16 12/28/2015    CREATININE 0 98 12/28/2015    GLUCOSE 103 12/28/2015     No results found for: PTT   No results found for: INR      No results found for: HGBA1C      Physical Exam    Airway    Mallampati score: II  TM Distance: >3 FB       Dental       Cardiovascular      Pulmonary      Other Findings        Anesthesia Plan  ASA Score- 2     Anesthesia Type- IV sedation with anesthesia with ASA Monitors  Additional Monitors:   Airway Plan:     Comment: DAVID Snow , have personally seen and evaluated the patient prior to anesthetic care  I have reviewed the pre-anesthetic record, and other medical records if appropriate to the anesthetic care  If a CRNA is involved in the case, I have reviewed the CRNA assessment, if present, and agree  Risks/benefits and alternatives discussed with patient including possible PONV, sore throat, and possibility of rare anesthetic and surgical emergencies        Plan Factors- Patient instructed to abstain from smoking on day of procedure  Patient did not smoke on day of surgery  Induction-     Postoperative Plan-     Informed Consent- Anesthetic plan and risks discussed with patient  I personally reviewed this patient with the CRNA  Discussed and agreed on the Anesthesia Plan with the CRNA  Lary Marrero

## 2018-04-13 NOTE — ANESTHESIA POSTPROCEDURE EVALUATION
Post-Op Assessment Note      CV Status:  Stable    Mental Status:  Alert and awake    Hydration Status:  Euvolemic    PONV Controlled:  Controlled    Airway Patency:  Patent    Post Op Vitals Reviewed: Yes          Staff: CRNA           BP      Temp      Pulse     Resp      SpO2   97

## 2018-04-13 NOTE — OP NOTE
**** GI/ENDOSCOPY REPORT ****     PATIENT NAME: Haley Lemons ------ VISIT ID:  Patient ID:   [id number] YOB: 1965     INTRODUCTION: Colonoscopy - A 52 male patient presents for an outpatient   Colonoscopy at [facility]  PREVIOUS COLONOSCOPY: No prior colonoscopy  INDICATIONS: Screening for personal history of polyps  CONSENT:  The benefits, risks, and alternatives to the procedure were   discussed and informed consent was obtained from the patient  PREPARATION: EKG, pulse, pulse oximetry and blood pressure were monitored   throughout the procedure  The patient was identified by myself both   verbally and by visual inspection of ID band  Airway Assessment   Classification: Airway class 2 - Visualization of the soft palate, fauces   and uvula  ASA Classification: Class 2 - Patient has mild to moderate   systemic disturbance that may or may not be related to the disorder   requiring surgery  MEDICATIONS: Anesthesia-check records     PROCEDURE:  The endoscope was passed without difficulty through the anus   under direct visualization and advanced to the cecum, confirmed by   appendiceal orifice and ileocecal valve  The scope was withdrawn and the   mucosa was carefully examined  The quality of the preparation was good  RECTAL EXAM: Normal rectal exam      FINDINGS:  A single flat polyp, measuring less than 5 mm in size, was   found in the rectum  The polyp was completely removed by cold biopsy   polypectomy  The polyp was retrieved  Otherwise, the colon appeared to be   normal      COMPLICATIONS: There were no complications  IMPRESSIONS: A single flat polyp found in the rectum; removed by cold   biopsy polypectomy  RECOMMENDATIONS: Colonoscopy recommended in 5 years  Discharge home when   standard parameters are met  Resume regular diet as tolerated  Follow-up   on the results of the biopsy specimens       ESTIMATED BLOOD LOSS: PATHOLOGY SPECIMENS: Completely removed by cold biopsy polypectomy  PROCEDURE CODES:     ICD-9 Codes: V12 72 Personal history of colonic polyps 211 4 Benign   neoplasm of rectum and anal canal     ICD-10 Codes: Z86 010 Personal history of colonic polyps K63 5 Polyp of   colon     PERFORMED BY: DAVID Nuñez  on 04/13/2018  Version 1, electronically signed by DAVID Yap  on 04/13/2018   at 13:19

## 2018-04-18 ENCOUNTER — TELEPHONE (OUTPATIENT)
Dept: GASTROENTEROLOGY | Facility: CLINIC | Age: 53
End: 2018-04-18

## 2018-04-18 NOTE — TELEPHONE ENCOUNTER
Dr Sandy De Luna patient---dr Baldomero Oakes would like a call back regarding this patient   She can be reached at [phone]

## 2018-04-18 NOTE — TELEPHONE ENCOUNTER
Spoke with path - neuroendocrine tumor  Recommending resection however it was in the rectum  Will need to discuss this further w/ whoever is covering for Dr Yolie Salas Balloon

## 2018-04-27 ENCOUNTER — TELEPHONE (OUTPATIENT)
Dept: GASTROENTEROLOGY | Facility: CLINIC | Age: 53
End: 2018-04-27

## 2018-04-27 NOTE — TELEPHONE ENCOUNTER
Called twice  Left voicemail to return call  Discussed pathology with Dr Opal Ashley who noted +tumor at margin of rectal polyp that was removed  Would recommend repeat flex sig to evaluate polypectomy site and biopsies with Dr Octavio Mason once he returns  If site is not revealing, he may require surgical evaluation

## 2018-04-27 NOTE — TELEPHONE ENCOUNTER
As per Dr Yonny Garcia is request I have tried to call the patient and have left him a voice mail with my cell phone number  I will await his phone call     ----- Message from Raulito Alcaraz MD sent at 4/20/2018  1:33 PM EDT -----  Marichuy Rule, Could you please discuss this result with the patient? It was small rectal polyp, removed with cold biopsy forceps  Will need repeat flexible sigmoidoscopy  Thanks            ----- Message -----  From: Lab, Background User  Sent: 4/17/2018   1:44 PM  To:  Raulito Alcaraz MD

## 2019-05-02 ENCOUNTER — APPOINTMENT (OUTPATIENT)
Dept: URGENT CARE | Facility: MEDICAL CENTER | Age: 54
End: 2019-05-02
Payer: OTHER MISCELLANEOUS

## 2019-05-02 ENCOUNTER — HOSPITAL ENCOUNTER (INPATIENT)
Facility: HOSPITAL | Age: 54
LOS: 4 days | Discharge: HOME | DRG: 278 | End: 2019-05-06
Attending: EMERGENCY MEDICINE | Admitting: INTERNAL MEDICINE
Payer: OTHER MISCELLANEOUS

## 2019-05-02 ENCOUNTER — APPOINTMENT (EMERGENCY)
Dept: RADIOLOGY | Facility: HOSPITAL | Age: 54
DRG: 278 | End: 2019-05-02
Payer: OTHER MISCELLANEOUS

## 2019-05-02 DIAGNOSIS — D72.810 LYMPHOPENIA: ICD-10-CM

## 2019-05-02 DIAGNOSIS — L03.114 CELLULITIS OF LEFT HAND: ICD-10-CM

## 2019-05-02 DIAGNOSIS — S60.112A SUBUNGUAL HEMATOMA OF LEFT THUMB, INITIAL ENCOUNTER: Primary | ICD-10-CM

## 2019-05-02 DIAGNOSIS — L03.012 PARONYCHIA OF LEFT THUMB: Primary | ICD-10-CM

## 2019-05-02 DIAGNOSIS — I89.1 LYMPHANGITIS: ICD-10-CM

## 2019-05-02 PROBLEM — L03.91 ACUTE BACTERIAL LYMPHANGITIS: Status: ACTIVE | Noted: 2019-05-02

## 2019-05-02 PROBLEM — B96.89 ACUTE BACTERIAL LYMPHANGITIS: Status: ACTIVE | Noted: 2019-05-02

## 2019-05-02 LAB
ANION GAP SERPL CALCULATED.3IONS-SCNC: 11 MMOL/L (ref 4–13)
BASOPHILS # BLD AUTO: 0.04 THOUSANDS/ΜL (ref 0–0.1)
BASOPHILS NFR BLD AUTO: 1 % (ref 0–1)
BUN SERPL-MCNC: 12 MG/DL (ref 5–25)
CALCIUM SERPL-MCNC: 9.5 MG/DL (ref 8.3–10.1)
CHLORIDE SERPL-SCNC: 102 MMOL/L (ref 100–108)
CO2 SERPL-SCNC: 26 MMOL/L (ref 21–32)
CREAT SERPL-MCNC: 1.02 MG/DL (ref 0.6–1.3)
EOSINOPHIL # BLD AUTO: 0 THOUSAND/ΜL (ref 0–0.61)
EOSINOPHIL NFR BLD AUTO: 0 % (ref 0–6)
ERYTHROCYTE [DISTWIDTH] IN BLOOD BY AUTOMATED COUNT: 13.3 % (ref 11.6–15.1)
GFR SERPL CREATININE-BSD FRML MDRD: 95 ML/MIN/1.73SQ M
GLUCOSE SERPL-MCNC: 115 MG/DL (ref 65–140)
HCT VFR BLD AUTO: 42.2 % (ref 36.5–49.3)
HGB BLD-MCNC: 13.6 G/DL (ref 12–17)
IMM GRANULOCYTES # BLD AUTO: 0.02 THOUSAND/UL (ref 0–0.2)
IMM GRANULOCYTES NFR BLD AUTO: 0 % (ref 0–2)
LACTATE SERPL-SCNC: 0.6 MMOL/L (ref 0.5–2)
LYMPHOCYTES # BLD AUTO: 0.79 THOUSANDS/ΜL (ref 0.6–4.47)
LYMPHOCYTES NFR BLD AUTO: 10 % (ref 14–44)
MCH RBC QN AUTO: 30.2 PG (ref 26.8–34.3)
MCHC RBC AUTO-ENTMCNC: 32.2 G/DL (ref 31.4–37.4)
MCV RBC AUTO: 94 FL (ref 82–98)
MONOCYTES # BLD AUTO: 0.81 THOUSAND/ΜL (ref 0.17–1.22)
MONOCYTES NFR BLD AUTO: 10 % (ref 4–12)
NEUTROPHILS # BLD AUTO: 6.46 THOUSANDS/ΜL (ref 1.85–7.62)
NEUTS SEG NFR BLD AUTO: 79 % (ref 43–75)
NRBC BLD AUTO-RTO: 0 /100 WBCS
PLATELET # BLD AUTO: 227 THOUSANDS/UL (ref 149–390)
PMV BLD AUTO: 11 FL (ref 8.9–12.7)
POTASSIUM SERPL-SCNC: 3.7 MMOL/L (ref 3.5–5.3)
RBC # BLD AUTO: 4.5 MILLION/UL (ref 3.88–5.62)
SODIUM SERPL-SCNC: 139 MMOL/L (ref 136–145)
WBC # BLD AUTO: 8.12 THOUSAND/UL (ref 4.31–10.16)

## 2019-05-02 PROCEDURE — 10140 I&D HMTMA SEROMA/FLUID COLLJ: CPT | Performed by: NURSE PRACTITIONER

## 2019-05-02 PROCEDURE — 99285 EMERGENCY DEPT VISIT HI MDM: CPT

## 2019-05-02 PROCEDURE — 87205 SMEAR GRAM STAIN: CPT | Performed by: INTERNAL MEDICINE

## 2019-05-02 PROCEDURE — 83605 ASSAY OF LACTIC ACID: CPT | Performed by: EMERGENCY MEDICINE

## 2019-05-02 PROCEDURE — 80048 BASIC METABOLIC PNL TOTAL CA: CPT | Performed by: EMERGENCY MEDICINE

## 2019-05-02 PROCEDURE — 10060 I&D ABSCESS SIMPLE/SINGLE: CPT | Performed by: SURGERY

## 2019-05-02 PROCEDURE — 87070 CULTURE OTHR SPECIMN AEROBIC: CPT | Performed by: INTERNAL MEDICINE

## 2019-05-02 PROCEDURE — 73140 X-RAY EXAM OF FINGER(S): CPT

## 2019-05-02 PROCEDURE — 85025 COMPLETE CBC W/AUTO DIFF WBC: CPT | Performed by: EMERGENCY MEDICINE

## 2019-05-02 PROCEDURE — 0X9J0ZZ DRAINAGE OF RIGHT HAND, OPEN APPROACH: ICD-10-PCS | Performed by: INTERNAL MEDICINE

## 2019-05-02 PROCEDURE — 99253 IP/OBS CNSLTJ NEW/EST LOW 45: CPT | Performed by: SURGERY

## 2019-05-02 PROCEDURE — 36415 COLL VENOUS BLD VENIPUNCTURE: CPT | Performed by: EMERGENCY MEDICINE

## 2019-05-02 PROCEDURE — 0HBQXZZ EXCISION OF FINGER NAIL, EXTERNAL APPROACH: ICD-10-PCS | Performed by: SURGERY

## 2019-05-02 PROCEDURE — 99213 OFFICE O/P EST LOW 20 MIN: CPT | Performed by: NURSE PRACTITIONER

## 2019-05-02 PROCEDURE — 96375 TX/PRO/DX INJ NEW DRUG ADDON: CPT

## 2019-05-02 PROCEDURE — 87070 CULTURE OTHR SPECIMN AEROBIC: CPT | Performed by: NURSE PRACTITIONER

## 2019-05-02 PROCEDURE — 87040 BLOOD CULTURE FOR BACTERIA: CPT | Performed by: EMERGENCY MEDICINE

## 2019-05-02 PROCEDURE — 99222 1ST HOSP IP/OBS MODERATE 55: CPT | Performed by: INTERNAL MEDICINE

## 2019-05-02 PROCEDURE — 87147 CULTURE TYPE IMMUNOLOGIC: CPT | Performed by: NURSE PRACTITIONER

## 2019-05-02 PROCEDURE — 99284 EMERGENCY DEPT VISIT MOD MDM: CPT | Performed by: EMERGENCY MEDICINE

## 2019-05-02 PROCEDURE — 11730 AVULSION NAIL PLATE SIMPLE 1: CPT | Performed by: SURGERY

## 2019-05-02 PROCEDURE — 87205 SMEAR GRAM STAIN: CPT | Performed by: NURSE PRACTITIONER

## 2019-05-02 PROCEDURE — 96365 THER/PROPH/DIAG IV INF INIT: CPT

## 2019-05-02 RX ORDER — VANCOMYCIN HYDROCHLORIDE 1 G/200ML
15 INJECTION, SOLUTION INTRAVENOUS EVERY 12 HOURS
Status: DISCONTINUED | OUTPATIENT
Start: 2019-05-03 | End: 2019-05-03

## 2019-05-02 RX ORDER — LIDOCAINE HYDROCHLORIDE 10 MG/ML
10 INJECTION, SOLUTION INFILTRATION; PERINEURAL ONCE
Status: DISCONTINUED | OUTPATIENT
Start: 2019-05-02 | End: 2019-05-02

## 2019-05-02 RX ORDER — KETOROLAC TROMETHAMINE 30 MG/ML
30 INJECTION, SOLUTION INTRAMUSCULAR; INTRAVENOUS EVERY 6 HOURS PRN
Status: DISCONTINUED | OUTPATIENT
Start: 2019-05-02 | End: 2019-05-06 | Stop reason: HOSPADM

## 2019-05-02 RX ORDER — OXYCODONE HYDROCHLORIDE 5 MG/1
5 TABLET ORAL EVERY 6 HOURS PRN
Status: DISCONTINUED | OUTPATIENT
Start: 2019-05-02 | End: 2019-05-06 | Stop reason: HOSPADM

## 2019-05-02 RX ORDER — MORPHINE SULFATE 4 MG/ML
4 INJECTION, SOLUTION INTRAMUSCULAR; INTRAVENOUS ONCE
Status: COMPLETED | OUTPATIENT
Start: 2019-05-02 | End: 2019-05-02

## 2019-05-02 RX ORDER — ACETAMINOPHEN 325 MG/1
650 TABLET ORAL EVERY 6 HOURS PRN
Status: DISCONTINUED | OUTPATIENT
Start: 2019-05-02 | End: 2019-05-06 | Stop reason: HOSPADM

## 2019-05-02 RX ORDER — ONDANSETRON 2 MG/ML
4 INJECTION INTRAMUSCULAR; INTRAVENOUS ONCE
Status: COMPLETED | OUTPATIENT
Start: 2019-05-02 | End: 2019-05-02

## 2019-05-02 RX ORDER — LIDOCAINE HYDROCHLORIDE AND EPINEPHRINE 10; 10 MG/ML; UG/ML
10 INJECTION, SOLUTION INFILTRATION; PERINEURAL ONCE
Status: COMPLETED | OUTPATIENT
Start: 2019-05-02 | End: 2019-05-02

## 2019-05-02 RX ORDER — VANCOMYCIN HYDROCHLORIDE 1 G/200ML
15 INJECTION, SOLUTION INTRAVENOUS ONCE
Status: COMPLETED | OUTPATIENT
Start: 2019-05-02 | End: 2019-05-03

## 2019-05-02 RX ADMIN — MORPHINE SULFATE 4 MG: 4 INJECTION INTRAVENOUS at 15:05

## 2019-05-02 RX ADMIN — PIPERACILLIN SODIUM AND TAZOBACTAM SODIUM 3.38 G: 36; 4.5 INJECTION, POWDER, FOR SOLUTION INTRAVENOUS at 15:16

## 2019-05-02 RX ADMIN — ONDANSETRON 4 MG: 2 INJECTION INTRAMUSCULAR; INTRAVENOUS at 15:03

## 2019-05-02 RX ADMIN — VANCOMYCIN HYDROCHLORIDE 1000 MG: 1 INJECTION, SOLUTION INTRAVENOUS at 16:08

## 2019-05-02 RX ADMIN — PIPERACILLIN SODIUM AND TAZOBACTAM SODIUM 3.38 G: 36; 4.5 INJECTION, POWDER, FOR SOLUTION INTRAVENOUS at 20:30

## 2019-05-02 RX ADMIN — LIDOCAINE HYDROCHLORIDE,EPINEPHRINE BITARTRATE 10 ML: 10; .01 INJECTION, SOLUTION INFILTRATION; PERINEURAL at 18:55

## 2019-05-03 LAB
ANION GAP SERPL CALCULATED.3IONS-SCNC: 9 MMOL/L (ref 4–13)
BASOPHILS # BLD AUTO: 0.02 THOUSANDS/ΜL (ref 0–0.1)
BASOPHILS NFR BLD AUTO: 1 % (ref 0–1)
BUN SERPL-MCNC: 13 MG/DL (ref 5–25)
CALCIUM SERPL-MCNC: 9.2 MG/DL (ref 8.3–10.1)
CHLORIDE SERPL-SCNC: 102 MMOL/L (ref 100–108)
CO2 SERPL-SCNC: 25 MMOL/L (ref 21–32)
CREAT SERPL-MCNC: 1.12 MG/DL (ref 0.6–1.3)
EOSINOPHIL # BLD AUTO: 0.03 THOUSAND/ΜL (ref 0–0.61)
EOSINOPHIL NFR BLD AUTO: 1 % (ref 0–6)
ERYTHROCYTE [DISTWIDTH] IN BLOOD BY AUTOMATED COUNT: 13.4 % (ref 11.6–15.1)
GFR SERPL CREATININE-BSD FRML MDRD: 84 ML/MIN/1.73SQ M
GLUCOSE SERPL-MCNC: 192 MG/DL (ref 65–140)
HCT VFR BLD AUTO: 38.4 % (ref 36.5–49.3)
HGB BLD-MCNC: 12.2 G/DL (ref 12–17)
IMM GRANULOCYTES # BLD AUTO: 0.01 THOUSAND/UL (ref 0–0.2)
IMM GRANULOCYTES NFR BLD AUTO: 0 % (ref 0–2)
LYMPHOCYTES # BLD AUTO: 0.71 THOUSANDS/ΜL (ref 0.6–4.47)
LYMPHOCYTES NFR BLD AUTO: 18 % (ref 14–44)
MCH RBC QN AUTO: 30.3 PG (ref 26.8–34.3)
MCHC RBC AUTO-ENTMCNC: 31.8 G/DL (ref 31.4–37.4)
MCV RBC AUTO: 96 FL (ref 82–98)
MONOCYTES # BLD AUTO: 0.5 THOUSAND/ΜL (ref 0.17–1.22)
MONOCYTES NFR BLD AUTO: 13 % (ref 4–12)
NEUTROPHILS # BLD AUTO: 2.7 THOUSANDS/ΜL (ref 1.85–7.62)
NEUTS SEG NFR BLD AUTO: 67 % (ref 43–75)
NRBC BLD AUTO-RTO: 0 /100 WBCS
PLATELET # BLD AUTO: 203 THOUSANDS/UL (ref 149–390)
PMV BLD AUTO: 10.8 FL (ref 8.9–12.7)
POTASSIUM SERPL-SCNC: 3.7 MMOL/L (ref 3.5–5.3)
RBC # BLD AUTO: 4.02 MILLION/UL (ref 3.88–5.62)
SODIUM SERPL-SCNC: 136 MMOL/L (ref 136–145)
WBC # BLD AUTO: 3.97 THOUSAND/UL (ref 4.31–10.16)

## 2019-05-03 PROCEDURE — 99231 SBSQ HOSP IP/OBS SF/LOW 25: CPT | Performed by: INTERNAL MEDICINE

## 2019-05-03 PROCEDURE — 80048 BASIC METABOLIC PNL TOTAL CA: CPT | Performed by: INTERNAL MEDICINE

## 2019-05-03 PROCEDURE — 85025 COMPLETE CBC W/AUTO DIFF WBC: CPT | Performed by: INTERNAL MEDICINE

## 2019-05-03 PROCEDURE — 99254 IP/OBS CNSLTJ NEW/EST MOD 60: CPT | Performed by: INTERNAL MEDICINE

## 2019-05-03 RX ORDER — VANCOMYCIN HYDROCHLORIDE 1 G/200ML
15 INJECTION, SOLUTION INTRAVENOUS EVERY 12 HOURS
Status: DISCONTINUED | OUTPATIENT
Start: 2019-05-03 | End: 2019-05-04

## 2019-05-03 RX ADMIN — VANCOMYCIN HYDROCHLORIDE 1000 MG: 1 INJECTION, SOLUTION INTRAVENOUS at 17:19

## 2019-05-03 RX ADMIN — PIPERACILLIN SODIUM AND TAZOBACTAM SODIUM 3.38 G: 36; 4.5 INJECTION, POWDER, FOR SOLUTION INTRAVENOUS at 01:49

## 2019-05-03 RX ADMIN — PIPERACILLIN SODIUM AND TAZOBACTAM SODIUM 3.38 G: 36; 4.5 INJECTION, POWDER, FOR SOLUTION INTRAVENOUS at 09:14

## 2019-05-03 RX ADMIN — PIPERACILLIN SODIUM AND TAZOBACTAM SODIUM 3.38 G: 36; 4.5 INJECTION, POWDER, FOR SOLUTION INTRAVENOUS at 21:34

## 2019-05-03 RX ADMIN — PIPERACILLIN SODIUM AND TAZOBACTAM SODIUM 3.38 G: 36; 4.5 INJECTION, POWDER, FOR SOLUTION INTRAVENOUS at 16:27

## 2019-05-03 RX ADMIN — KETOROLAC TROMETHAMINE 30 MG: 30 INJECTION, SOLUTION INTRAMUSCULAR at 09:14

## 2019-05-03 RX ADMIN — VANCOMYCIN HYDROCHLORIDE 1000 MG: 1 INJECTION, SOLUTION INTRAVENOUS at 04:14

## 2019-05-04 PROBLEM — D72.810 LYMPHOPENIA: Status: ACTIVE | Noted: 2019-05-04

## 2019-05-04 LAB
ANION GAP SERPL CALCULATED.3IONS-SCNC: 10 MMOL/L (ref 4–13)
BACTERIA WND AEROBE CULT: ABNORMAL
BASOPHILS # BLD AUTO: 0.02 THOUSANDS/ΜL (ref 0–0.1)
BASOPHILS NFR BLD AUTO: 1 % (ref 0–1)
BUN SERPL-MCNC: 19 MG/DL (ref 5–25)
CALCIUM SERPL-MCNC: 9 MG/DL (ref 8.3–10.1)
CHLORIDE SERPL-SCNC: 108 MMOL/L (ref 100–108)
CO2 SERPL-SCNC: 25 MMOL/L (ref 21–32)
CREAT SERPL-MCNC: 1.03 MG/DL (ref 0.6–1.3)
EOSINOPHIL # BLD AUTO: 0.07 THOUSAND/ΜL (ref 0–0.61)
EOSINOPHIL NFR BLD AUTO: 2 % (ref 0–6)
ERYTHROCYTE [DISTWIDTH] IN BLOOD BY AUTOMATED COUNT: 13.3 % (ref 11.6–15.1)
GFR SERPL CREATININE-BSD FRML MDRD: 93 ML/MIN/1.73SQ M
GLUCOSE SERPL-MCNC: 99 MG/DL (ref 65–140)
GRAM STN SPEC: ABNORMAL
GRAM STN SPEC: ABNORMAL
HCT VFR BLD AUTO: 38.8 % (ref 36.5–49.3)
HGB BLD-MCNC: 12.5 G/DL (ref 12–17)
IMM GRANULOCYTES # BLD AUTO: 0.01 THOUSAND/UL (ref 0–0.2)
IMM GRANULOCYTES NFR BLD AUTO: 0 % (ref 0–2)
LYMPHOCYTES # BLD AUTO: 0.62 THOUSANDS/ΜL (ref 0.6–4.47)
LYMPHOCYTES NFR BLD AUTO: 21 % (ref 14–44)
MCH RBC QN AUTO: 30.8 PG (ref 26.8–34.3)
MCHC RBC AUTO-ENTMCNC: 32.2 G/DL (ref 31.4–37.4)
MCV RBC AUTO: 96 FL (ref 82–98)
MONOCYTES # BLD AUTO: 0.43 THOUSAND/ΜL (ref 0.17–1.22)
MONOCYTES NFR BLD AUTO: 15 % (ref 4–12)
NEUTROPHILS # BLD AUTO: 1.8 THOUSANDS/ΜL (ref 1.85–7.62)
NEUTS SEG NFR BLD AUTO: 61 % (ref 43–75)
NRBC BLD AUTO-RTO: 0 /100 WBCS
PLATELET # BLD AUTO: 229 THOUSANDS/UL (ref 149–390)
PMV BLD AUTO: 10.7 FL (ref 8.9–12.7)
POTASSIUM SERPL-SCNC: 3.9 MMOL/L (ref 3.5–5.3)
RBC # BLD AUTO: 4.06 MILLION/UL (ref 3.88–5.62)
SODIUM SERPL-SCNC: 143 MMOL/L (ref 136–145)
WBC # BLD AUTO: 2.95 THOUSAND/UL (ref 4.31–10.16)

## 2019-05-04 PROCEDURE — 80048 BASIC METABOLIC PNL TOTAL CA: CPT | Performed by: INTERNAL MEDICINE

## 2019-05-04 PROCEDURE — 99233 SBSQ HOSP IP/OBS HIGH 50: CPT | Performed by: INTERNAL MEDICINE

## 2019-05-04 PROCEDURE — 85025 COMPLETE CBC W/AUTO DIFF WBC: CPT | Performed by: INTERNAL MEDICINE

## 2019-05-04 PROCEDURE — 99232 SBSQ HOSP IP/OBS MODERATE 35: CPT | Performed by: INTERNAL MEDICINE

## 2019-05-04 RX ADMIN — CEFEPIME HYDROCHLORIDE 1000 MG: 1 INJECTION, POWDER, FOR SOLUTION INTRAMUSCULAR; INTRAVENOUS at 17:31

## 2019-05-04 RX ADMIN — PIPERACILLIN SODIUM AND TAZOBACTAM SODIUM 3.38 G: 36; 4.5 INJECTION, POWDER, FOR SOLUTION INTRAVENOUS at 08:17

## 2019-05-04 RX ADMIN — KETOROLAC TROMETHAMINE 30 MG: 30 INJECTION, SOLUTION INTRAMUSCULAR at 08:18

## 2019-05-04 RX ADMIN — PIPERACILLIN SODIUM AND TAZOBACTAM SODIUM 3.38 G: 36; 4.5 INJECTION, POWDER, FOR SOLUTION INTRAVENOUS at 02:21

## 2019-05-04 RX ADMIN — VANCOMYCIN HYDROCHLORIDE 1000 MG: 1 INJECTION, SOLUTION INTRAVENOUS at 05:02

## 2019-05-05 LAB
ANION GAP SERPL CALCULATED.3IONS-SCNC: 7 MMOL/L (ref 4–13)
BASOPHILS # BLD MANUAL: 0 THOUSAND/UL (ref 0–0.1)
BASOPHILS NFR MAR MANUAL: 0 % (ref 0–1)
BUN SERPL-MCNC: 18 MG/DL (ref 5–25)
CALCIUM SERPL-MCNC: 9.1 MG/DL (ref 8.3–10.1)
CHLORIDE SERPL-SCNC: 109 MMOL/L (ref 100–108)
CO2 SERPL-SCNC: 27 MMOL/L (ref 21–32)
CREAT SERPL-MCNC: 1.05 MG/DL (ref 0.6–1.3)
EOSINOPHIL # BLD MANUAL: 0.08 THOUSAND/UL (ref 0–0.4)
EOSINOPHIL NFR BLD MANUAL: 3 % (ref 0–6)
ERYTHROCYTE [DISTWIDTH] IN BLOOD BY AUTOMATED COUNT: 13.1 % (ref 11.6–15.1)
GFR SERPL CREATININE-BSD FRML MDRD: 91 ML/MIN/1.73SQ M
GLUCOSE SERPL-MCNC: 104 MG/DL (ref 65–140)
HCT VFR BLD AUTO: 38.4 % (ref 36.5–49.3)
HGB BLD-MCNC: 12.3 G/DL (ref 12–17)
LYMPHOCYTES # BLD AUTO: 0.83 THOUSAND/UL (ref 0.6–4.47)
LYMPHOCYTES # BLD AUTO: 33 % (ref 14–44)
MCH RBC QN AUTO: 30.5 PG (ref 26.8–34.3)
MCHC RBC AUTO-ENTMCNC: 32 G/DL (ref 31.4–37.4)
MCV RBC AUTO: 95 FL (ref 82–98)
MONOCYTES # BLD AUTO: 0.38 THOUSAND/UL (ref 0–1.22)
MONOCYTES NFR BLD: 15 % (ref 4–12)
NEUTROPHILS # BLD MANUAL: 1.2 THOUSAND/UL (ref 1.85–7.62)
NEUTS SEG NFR BLD AUTO: 48 % (ref 43–75)
NRBC BLD AUTO-RTO: 0 /100 WBCS
PLATELET # BLD AUTO: 222 THOUSANDS/UL (ref 149–390)
PLATELET BLD QL SMEAR: ADEQUATE
PMV BLD AUTO: 10 FL (ref 8.9–12.7)
POTASSIUM SERPL-SCNC: 4.3 MMOL/L (ref 3.5–5.3)
RBC # BLD AUTO: 4.03 MILLION/UL (ref 3.88–5.62)
RBC MORPH BLD: NORMAL
SODIUM SERPL-SCNC: 143 MMOL/L (ref 136–145)
TOTAL CELLS COUNTED SPEC: 100
VARIANT LYMPHS # BLD AUTO: 1 %
WBC # BLD AUTO: 2.5 THOUSAND/UL (ref 4.31–10.16)

## 2019-05-05 PROCEDURE — 99232 SBSQ HOSP IP/OBS MODERATE 35: CPT | Performed by: INTERNAL MEDICINE

## 2019-05-05 PROCEDURE — 85007 BL SMEAR W/DIFF WBC COUNT: CPT | Performed by: INTERNAL MEDICINE

## 2019-05-05 PROCEDURE — 99231 SBSQ HOSP IP/OBS SF/LOW 25: CPT | Performed by: INTERNAL MEDICINE

## 2019-05-05 PROCEDURE — 80048 BASIC METABOLIC PNL TOTAL CA: CPT | Performed by: INTERNAL MEDICINE

## 2019-05-05 PROCEDURE — 85027 COMPLETE CBC AUTOMATED: CPT | Performed by: INTERNAL MEDICINE

## 2019-05-05 RX ADMIN — CEFEPIME HYDROCHLORIDE 1000 MG: 1 INJECTION, POWDER, FOR SOLUTION INTRAMUSCULAR; INTRAVENOUS at 05:01

## 2019-05-05 RX ADMIN — KETOROLAC TROMETHAMINE 30 MG: 30 INJECTION, SOLUTION INTRAMUSCULAR at 09:50

## 2019-05-05 RX ADMIN — ACETAMINOPHEN 650 MG: 325 TABLET ORAL at 05:21

## 2019-05-05 RX ADMIN — CEFEPIME HYDROCHLORIDE 1000 MG: 1 INJECTION, POWDER, FOR SOLUTION INTRAMUSCULAR; INTRAVENOUS at 18:05

## 2019-05-06 ENCOUNTER — TELEPHONE (OUTPATIENT)
Dept: INFECTIOUS DISEASES | Facility: CLINIC | Age: 54
End: 2019-05-06

## 2019-05-06 VITALS
RESPIRATION RATE: 18 BRPM | BODY MASS INDEX: 23.66 KG/M2 | SYSTOLIC BLOOD PRESSURE: 113 MMHG | DIASTOLIC BLOOD PRESSURE: 82 MMHG | OXYGEN SATURATION: 96 % | TEMPERATURE: 98.4 F | WEIGHT: 164.9 LBS | HEART RATE: 65 BPM

## 2019-05-06 DIAGNOSIS — L03.012 PARONYCHIA OF LEFT THUMB: Primary | ICD-10-CM

## 2019-05-06 PROBLEM — B96.89 ACUTE BACTERIAL LYMPHANGITIS: Status: RESOLVED | Noted: 2019-05-02 | Resolved: 2019-05-06

## 2019-05-06 PROBLEM — L03.91 ACUTE BACTERIAL LYMPHANGITIS: Status: RESOLVED | Noted: 2019-05-02 | Resolved: 2019-05-06

## 2019-05-06 LAB
BACTERIA WND AEROBE CULT: ABNORMAL
BACTERIA WND AEROBE CULT: ABNORMAL
BASOPHILS # BLD AUTO: 0.03 THOUSANDS/ΜL (ref 0–0.1)
BASOPHILS NFR BLD AUTO: 1 % (ref 0–1)
EOSINOPHIL # BLD AUTO: 0.08 THOUSAND/ΜL (ref 0–0.61)
EOSINOPHIL NFR BLD AUTO: 3 % (ref 0–6)
ERYTHROCYTE [DISTWIDTH] IN BLOOD BY AUTOMATED COUNT: 13.2 % (ref 11.6–15.1)
GRAM STN SPEC: ABNORMAL
HCT VFR BLD AUTO: 38.3 % (ref 36.5–49.3)
HGB BLD-MCNC: 12.2 G/DL (ref 12–17)
IMM GRANULOCYTES # BLD AUTO: 0.01 THOUSAND/UL (ref 0–0.2)
IMM GRANULOCYTES NFR BLD AUTO: 0 % (ref 0–2)
LYMPHOCYTES # BLD AUTO: 0.77 THOUSANDS/ΜL (ref 0.6–4.47)
LYMPHOCYTES NFR BLD AUTO: 32 % (ref 14–44)
MCH RBC QN AUTO: 30.4 PG (ref 26.8–34.3)
MCHC RBC AUTO-ENTMCNC: 31.9 G/DL (ref 31.4–37.4)
MCV RBC AUTO: 96 FL (ref 82–98)
MONOCYTES # BLD AUTO: 0.37 THOUSAND/ΜL (ref 0.17–1.22)
MONOCYTES NFR BLD AUTO: 15 % (ref 4–12)
NEUTROPHILS # BLD AUTO: 1.16 THOUSANDS/ΜL (ref 1.85–7.62)
NEUTS SEG NFR BLD AUTO: 49 % (ref 43–75)
NRBC BLD AUTO-RTO: 0 /100 WBCS
PLATELET # BLD AUTO: 239 THOUSANDS/UL (ref 149–390)
PMV BLD AUTO: 9.9 FL (ref 8.9–12.7)
RBC # BLD AUTO: 4.01 MILLION/UL (ref 3.88–5.62)
WBC # BLD AUTO: 2.42 THOUSAND/UL (ref 4.31–10.16)

## 2019-05-06 PROCEDURE — 85025 COMPLETE CBC W/AUTO DIFF WBC: CPT | Performed by: INTERNAL MEDICINE

## 2019-05-06 PROCEDURE — 99239 HOSP IP/OBS DSCHRG MGMT >30: CPT | Performed by: INTERNAL MEDICINE

## 2019-05-06 PROCEDURE — 99232 SBSQ HOSP IP/OBS MODERATE 35: CPT | Performed by: INTERNAL MEDICINE

## 2019-05-06 RX ORDER — OXYCODONE HYDROCHLORIDE 5 MG/1
5 TABLET ORAL EVERY 6 HOURS PRN
Qty: 20 TABLET | Refills: 0 | Status: SHIPPED | OUTPATIENT
Start: 2019-05-06 | End: 2019-05-16

## 2019-05-06 RX ORDER — AMOXICILLIN AND CLAVULANATE POTASSIUM 875; 125 MG/1; MG/1
1 TABLET, FILM COATED ORAL EVERY 12 HOURS SCHEDULED
Qty: 20 TABLET | Refills: 0 | Status: SHIPPED | OUTPATIENT
Start: 2019-05-06 | End: 2019-05-16

## 2019-05-06 RX ORDER — AMOXICILLIN AND CLAVULANATE POTASSIUM 875; 125 MG/1; MG/1
1 TABLET, FILM COATED ORAL EVERY 12 HOURS SCHEDULED
Status: DISCONTINUED | OUTPATIENT
Start: 2019-05-06 | End: 2019-05-06 | Stop reason: HOSPADM

## 2019-05-06 RX ADMIN — CEFEPIME HYDROCHLORIDE 1000 MG: 1 INJECTION, POWDER, FOR SOLUTION INTRAMUSCULAR; INTRAVENOUS at 03:53

## 2019-05-06 RX ADMIN — MORPHINE SULFATE 2 MG: 2 INJECTION, SOLUTION INTRAMUSCULAR; INTRAVENOUS at 15:37

## 2019-05-06 RX ADMIN — AMOXICILLIN AND CLAVULANATE POTASSIUM 1 TABLET: 875; 125 TABLET, FILM COATED ORAL at 13:00

## 2019-05-07 ENCOUNTER — TELEPHONE (OUTPATIENT)
Dept: PLASTIC SURGERY | Facility: CLINIC | Age: 54
End: 2019-05-07

## 2019-05-07 LAB
BACTERIA BLD CULT: NORMAL
BACTERIA BLD CULT: NORMAL

## 2019-05-08 ENCOUNTER — TELEPHONE (OUTPATIENT)
Dept: FAMILY MEDICINE CLINIC | Facility: CLINIC | Age: 54
End: 2019-05-08

## 2019-05-09 ENCOUNTER — TELEPHONE (OUTPATIENT)
Dept: FAMILY MEDICINE CLINIC | Facility: CLINIC | Age: 54
End: 2019-05-09

## 2019-05-09 ENCOUNTER — OFFICE VISIT (OUTPATIENT)
Dept: PLASTIC SURGERY | Facility: MEDICAL CENTER | Age: 54
End: 2019-05-09
Payer: OTHER MISCELLANEOUS

## 2019-05-09 VITALS — WEIGHT: 165.5 LBS | BODY MASS INDEX: 23.75 KG/M2

## 2019-05-09 DIAGNOSIS — L03.012 PARONYCHIA OF LEFT THUMB: Primary | ICD-10-CM

## 2019-05-09 PROCEDURE — 99214 OFFICE O/P EST MOD 30 MIN: CPT | Performed by: PHYSICIAN ASSISTANT

## 2019-05-09 RX ORDER — GABAPENTIN 100 MG/1
100 CAPSULE ORAL 3 TIMES DAILY
Qty: 30 CAPSULE | Refills: 0 | Status: SHIPPED | OUTPATIENT
Start: 2019-05-09 | End: 2019-05-19

## 2019-05-17 ENCOUNTER — TELEPHONE (OUTPATIENT)
Dept: INFECTIOUS DISEASES | Facility: CLINIC | Age: 54
End: 2019-05-17

## 2019-05-23 ENCOUNTER — OFFICE VISIT (OUTPATIENT)
Dept: PLASTIC SURGERY | Facility: CLINIC | Age: 54
End: 2019-05-23
Payer: OTHER MISCELLANEOUS

## 2019-05-23 VITALS — HEIGHT: 71 IN | BODY MASS INDEX: 23.1 KG/M2 | WEIGHT: 165 LBS

## 2019-05-23 DIAGNOSIS — L03.012 PARONYCHIA OF LEFT THUMB: Primary | ICD-10-CM

## 2019-05-23 PROCEDURE — 99213 OFFICE O/P EST LOW 20 MIN: CPT | Performed by: PHYSICIAN ASSISTANT

## 2019-05-24 ENCOUNTER — TELEPHONE (OUTPATIENT)
Dept: PLASTIC SURGERY | Facility: CLINIC | Age: 54
End: 2019-05-24

## 2019-07-18 ENCOUNTER — TELEPHONE (OUTPATIENT)
Dept: FAMILY MEDICINE CLINIC | Facility: CLINIC | Age: 54
End: 2019-07-18

## 2019-07-18 DIAGNOSIS — B00.9 HSV INFECTION: ICD-10-CM

## 2019-07-18 RX ORDER — ACYCLOVIR 800 MG/1
TABLET ORAL
Qty: 30 TABLET | Refills: 0 | Status: SHIPPED | OUTPATIENT
Start: 2019-07-18 | End: 2019-07-18

## 2019-07-18 RX ORDER — ACYCLOVIR 800 MG/1
TABLET ORAL
Qty: 30 TABLET | Refills: 0 | Status: SHIPPED | OUTPATIENT
Start: 2019-07-18 | End: 2020-10-20 | Stop reason: SDUPTHER

## 2020-04-23 ENCOUNTER — TELEPHONE (OUTPATIENT)
Dept: FAMILY MEDICINE CLINIC | Facility: CLINIC | Age: 55
End: 2020-04-23

## 2020-10-20 DIAGNOSIS — B00.9 HSV INFECTION: ICD-10-CM

## 2020-10-21 RX ORDER — ACYCLOVIR 800 MG/1
TABLET ORAL
Qty: 30 TABLET | Refills: 0 | Status: SHIPPED | OUTPATIENT
Start: 2020-10-21 | End: 2021-10-20

## 2024-01-30 ENCOUNTER — TELEPHONE (OUTPATIENT)
Dept: GASTROENTEROLOGY | Facility: CLINIC | Age: 59
End: 2024-01-30

## 2024-01-30 ENCOUNTER — OFFICE VISIT (OUTPATIENT)
Dept: FAMILY MEDICINE CLINIC | Facility: CLINIC | Age: 59
End: 2024-01-30
Payer: COMMERCIAL

## 2024-01-30 VITALS
HEART RATE: 84 BPM | TEMPERATURE: 99.2 F | HEIGHT: 71 IN | OXYGEN SATURATION: 97 % | DIASTOLIC BLOOD PRESSURE: 82 MMHG | BODY MASS INDEX: 22.96 KG/M2 | SYSTOLIC BLOOD PRESSURE: 110 MMHG | WEIGHT: 164 LBS

## 2024-01-30 DIAGNOSIS — Z13.220 SCREENING, LIPID: ICD-10-CM

## 2024-01-30 DIAGNOSIS — Z12.5 SCREENING PSA (PROSTATE SPECIFIC ANTIGEN): ICD-10-CM

## 2024-01-30 DIAGNOSIS — B00.9 HSV-2 INFECTION: ICD-10-CM

## 2024-01-30 DIAGNOSIS — D3A.8 NEUROENDOCRINE TUMOR: ICD-10-CM

## 2024-01-30 DIAGNOSIS — Z86.010 HISTORY OF COLON POLYPS: ICD-10-CM

## 2024-01-30 DIAGNOSIS — Z00.00 ENCOUNTER FOR ANNUAL PHYSICAL EXAM: Primary | ICD-10-CM

## 2024-01-30 DIAGNOSIS — Z12.11 COLON CANCER SCREENING: ICD-10-CM

## 2024-01-30 PROBLEM — L03.012 PARONYCHIA OF LEFT THUMB: Status: RESOLVED | Noted: 2019-05-02 | Resolved: 2024-01-30

## 2024-01-30 PROBLEM — M75.111 PARTIAL TEAR OF RIGHT ROTATOR CUFF: Status: RESOLVED | Noted: 2018-01-31 | Resolved: 2024-01-30

## 2024-01-30 PROCEDURE — 99396 PREV VISIT EST AGE 40-64: CPT | Performed by: FAMILY MEDICINE

## 2024-01-30 RX ORDER — VALACYCLOVIR HYDROCHLORIDE 1 G/1
TABLET, FILM COATED ORAL
Qty: 12 TABLET | Refills: 0 | Status: SHIPPED | OUTPATIENT
Start: 2024-01-30 | End: 2026-01-14

## 2024-01-30 NOTE — TELEPHONE ENCOUNTER
----- Message from Wilman Wan sent at 1/30/2024  2:48 PM EST -----  Regarding: FW: Needs redo colonoscopy    ----- Message -----  From: Awais Armendariz MD  Sent: 1/30/2024   2:39 PM EST  To: Obdulio Miller MD; #  Subject: FW: Needs redo colonoscopy                       This patient is due for surveillance colonoscopy because of his history of rectal neuroendocrine tumor. Please schedule with Dr. Miller. Thanks, Awais    ----- Message -----  From: Yeison Mayer DO  Sent: 1/30/2024  11:40 AM EST  To: Awais Armendariz MD  Subject: Needs redo colonoscopy                           I saw him today for the first time, he had a 2018 colonoscopy with [facility], well-differentiated neuroendocrine tumor, he feels well, needs reevaluation, can you please have your staff set that up for him?  Thanks

## 2024-01-30 NOTE — PROGRESS NOTES
FAMILY PRACTICE OFFICE VISIT  Yeison Mayer D.O.    [facility]  [facility]  [address]  [address]  [place], [address]      NAME: Colin Blair  AGE: 58 y.o. SEX: male  : 1965   MRN: [id number]    DATE: 2024  TIME: 12:29 PM    Assessment and Plan     Problem List Items Addressed This Visit       History of colon polyps    Relevant Orders    Ambulatory Referral to Gastroenterology    Neuroendocrine tumor -well-differentiated colon polyp     Relevant Orders    CBC    Comprehensive metabolic panel    Ambulatory Referral to Gastroenterology     Other Visit Diagnoses       Encounter for annual physical exam    -  Primary    HSV-2 infection        Relevant Medications    valACYclovir (VALTREX) 1,000 mg tablet    Colon cancer screening        Relevant Orders    Ambulatory Referral to Gastroenterology    Screening PSA (prostate specific antigen)        Relevant Orders    PSA, Total Screen    Screening, lipid        Relevant Orders    Lipid panel            Patient Instructions   Healthy 58-year-old, reviewed health history, currently on no medication.    He did have a colonoscopy with [facility] in 2018, pathology with polyp showed well-differentiated neuroendocrine tumor, has not had follow-up, I would like him to reevaluate with GI, needs another colonoscopy.  He has had no weight loss nor other worrisome signs or symptoms.    Previous blood work shows chronic slightly low WBC, he will redo fasting blood work    I did send prescription for Valtrex 1000 mg, can use 2 pills at onset of cold sore, redo 12 hours later.  Call us if anything worsens    We did review previous blood work, await redo  He is not up to date with Lipid screening.  He is not up to date with Diabetes screening.    Immunization History   Administered Date(s) Administered    COVID-19 MODERNA VACC 0.5 ML IM 2021, 2021, 2022    Influenza, seasonal, injectable 10/01/2013     Tdap 01/06/2012       He does not do yearly Flu shot.   Tdap/tetanus shot will be done at a future date  (done every 10 yrs for superficial cuts, every 5 yrs for deep wounds)   Can also look into coverage for 'shingles' shot, Shingrix - Can do that here or at pharmacy.  Covid vaccine rcvd x 3    Non-smoker    Discussed Prostate Cancer screening pros/cons starting at age 50.  PSA blood test up-to-date,  ordered.  No prior available, he has no urinary symptoms.  Had tried sildenafil in the past, developed headache.    Continue to try to watch healthy diet, exercise routinely.    We will see him again in 12 months, sooner as needed.          Chief Complaint     Chief Complaint   Patient presents with    New Patient Visit       History of Present Illness   Colin Blair is a 58 y.o.-year-old male who is in to establish care.  Originally from [place].  Works in .    Feels well, exercises routinely      Review of Systems   Review of Systems   Constitutional:  Negative for activity change (Exercises at the gym 4 times weekly, weights, some cardio.  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   HENT:  Negative for sore throat and trouble swallowing.    Respiratory:  Negative for cough, chest tightness, shortness of breath and wheezing.    Cardiovascular:  Negative for chest pain, palpitations and leg swelling.   Gastrointestinal:  Negative for abdominal distention, abdominal pain, blood in stool, constipation, diarrhea, nausea, rectal pain and vomiting.        No acid reflux     No change in bowel   Genitourinary:  Negative for dysuria and hematuria.   Skin:         Does have history HSV 2, uses antiviral once yearly or so, needs refill   Neurological:  Negative for dizziness, syncope, light-headedness and headaches.   Psychiatric/Behavioral:  Negative for behavioral problems, confusion and sleep disturbance.        Active Problem  "List     Patient Active Problem List   Diagnosis    History of colon polyps    Lymphopenia    Allergic rhinitis    Neuroendocrine tumor -well-differentiated colon polyp 2018       Past Medical History:  Reviewed    Past Surgical History:  Reviewed    Family History:  Reviewed    Social History:  Reviewed    Objective     Vitals:    01/30/24 1045   BP: 110/82   BP Location: Left arm   Patient Position: Sitting   Cuff Size: Adult   Pulse: 84   Temp: 99.2 °F (37.3 °C)   TempSrc: Temporal   SpO2: 97%   Weight: 74.4 kg (164 lb)   Height: 5' 10.5\" (1.791 m)     Body mass index is 23.2 kg/m².    BP Readings from Last 3 Encounters:   01/30/24 110/82   05/06/19 113/82   04/13/18 140/80       Wt Readings from Last 3 Encounters:   01/30/24 74.4 kg (164 lb)   05/23/19 74.8 kg (165 lb)   05/09/19 75.1 kg (165 lb 8 oz)       Physical Exam  Constitutional:       General: He is not in acute distress.     Appearance: Normal appearance. He is well-developed. He is not ill-appearing.   HENT:      Left Ear: Tympanic membrane normal.      Ears:      Comments: Wax on right     Mouth/Throat:      Pharynx: Oropharynx is clear.   Eyes:      General: No scleral icterus.  Neck:      Vascular: No carotid bruit.   Cardiovascular:      Rate and Rhythm: Normal rate and regular rhythm.      Heart sounds: Normal heart sounds. No murmur heard.  Pulmonary:      Effort: Pulmonary effort is normal. No respiratory distress.      Breath sounds: Normal breath sounds. No wheezing, rhonchi or rales.   Abdominal:      General: There is no distension.      Palpations: Abdomen is soft.      Tenderness: There is no abdominal tenderness. There is no guarding.   Musculoskeletal:      Right lower leg: No edema.      Left lower leg: No edema.   Lymphadenopathy:      Cervical: No cervical adenopathy.   Skin:     Coloration: Skin is not jaundiced.   Neurological:      General: No focal deficit present.      Mental Status: He is alert and oriented to person, place, " and time.   Psychiatric:         Mood and Affect: Mood normal.         Behavior: Behavior normal.         ALLERGIES:  Allergies   Allergen Reactions    Shrimp (Diagnostic) - Food Allergy Hives and Rash       Current Medications     Current Outpatient Medications   Medication Sig Dispense Refill    valACYclovir (VALTREX) 1,000 mg tablet Use 2 pills at first sign of cold sore, repeat 2 pills 12 hours later then stop 12 tablet 0     No current facility-administered medications for this visit.            Orders Placed This Encounter   Procedures    CBC    Comprehensive metabolic panel    Lipid panel    PSA, Total Screen    Ambulatory Referral to Gastroenterology         Yeison Mayer DO

## 2024-01-30 NOTE — PATIENT INSTRUCTIONS
Healthy 58-year-old, reviewed health history, currently on no medication.    He did have a colonoscopy with [facility] in April 2018, pathology with polyp showed well-differentiated neuroendocrine tumor, has not had follow-up, I would like him to reevaluate with GI, needs another colonoscopy.  He has had no weight loss nor other worrisome signs or symptoms.    Previous blood work shows chronic slightly low WBC, he will redo fasting blood work    I did send prescription for Valtrex 1000 mg, can use 2 pills at onset of cold sore, redo 12 hours later.  Call us if anything worsens    We did review previous blood work, await redo  He is not up to date with Lipid screening.  He is not up to date with Diabetes screening.    Immunization History   Administered Date(s) Administered    COVID-19 MODERNA VACC 0.5 ML IM 04/20/2021, 05/18/2021, 02/27/2022    Influenza, seasonal, injectable 10/01/2013    Tdap 01/06/2012       He does not do yearly Flu shot.   Tdap/tetanus shot will be done at a future date  (done every 10 yrs for superficial cuts, every 5 yrs for deep wounds)   Can also look into coverage for 'shingles' shot, Shingrix - Can do that here or at pharmacy.  Covid vaccine rcvd x 3    Non-smoker    Discussed Prostate Cancer screening pros/cons starting at age 50.  PSA blood test up-to-date,  ordered.  No prior available, he has no urinary symptoms.  Had tried sildenafil in the past, developed headache.    Continue to try to watch healthy diet, exercise routinely.    We will see him again in 12 months, sooner as needed.

## 2024-01-31 ENCOUNTER — TELEPHONE (OUTPATIENT)
Age: 59
End: 2024-01-31

## 2024-01-31 NOTE — TELEPHONE ENCOUNTER
Scheduled date of colonoscopy (as of today):3/15/24  Physician performing colonoscopy:DR SILVA  [facility]  Bowel prep reviewed with patient:ANA/SATHYA SENT TO EMAIL  Instructions reviewed with patient by:CM  Clearances: N/A    SOFIA [phone]

## 2024-01-31 NOTE — TELEPHONE ENCOUNTER
Does the patient want to see a Gastroenterologist prior to their procedure OR are they having any GI symptoms? no    Has the patient been hospitalized or had abdominal surgery in the past 6 months? no    Does the patient use supplemental oxygen? no    Does the patient take Coumadin, Lovenox, Plavix, Elliquis, Xarelto, or other blood thinning medication? no    Has the patient had a stroke, cardiac event, or stent placed in the past year? no

## 2024-02-19 ENCOUNTER — TELEPHONE (OUTPATIENT)
Age: 59
End: 2024-02-19

## 2024-02-19 NOTE — TELEPHONE ENCOUNTER
Calling to confirm his 3/15/2024 colonoscopy. Stating he doesn't have any instruction. / Prep sent via e-mail

## 2024-03-01 ENCOUNTER — ANESTHESIA EVENT (OUTPATIENT)
Dept: ANESTHESIOLOGY | Facility: HOSPITAL | Age: 59
End: 2024-03-01

## 2024-03-01 ENCOUNTER — ANESTHESIA (OUTPATIENT)
Dept: ANESTHESIOLOGY | Facility: HOSPITAL | Age: 59
End: 2024-03-01

## 2024-03-08 ENCOUNTER — TELEPHONE (OUTPATIENT)
Dept: GASTROENTEROLOGY | Facility: CLINIC | Age: 59
End: 2024-03-08

## 2024-03-08 NOTE — TELEPHONE ENCOUNTER
LVM to   Confirming Upcoming Procedure: colonoscopy  on 03/15/2024  Physician performing: Dr escoto   [facility]   Prep: miralax

## 2024-03-14 RX ORDER — SODIUM CHLORIDE 9 MG/ML
125 INJECTION, SOLUTION INTRAVENOUS CONTINUOUS
Status: CANCELLED | OUTPATIENT
Start: 2024-03-14

## 2024-03-15 ENCOUNTER — ANESTHESIA (OUTPATIENT)
Dept: GASTROENTEROLOGY | Facility: MEDICAL CENTER | Age: 59
End: 2024-03-15

## 2024-03-15 ENCOUNTER — ANESTHESIA EVENT (OUTPATIENT)
Dept: GASTROENTEROLOGY | Facility: MEDICAL CENTER | Age: 59
End: 2024-03-15

## 2024-03-15 ENCOUNTER — HOSPITAL ENCOUNTER (OUTPATIENT)
Dept: GASTROENTEROLOGY | Facility: MEDICAL CENTER | Age: 59
Setting detail: OUTPATIENT SURGERY
End: 2024-03-15
Attending: INTERNAL MEDICINE
Payer: COMMERCIAL

## 2024-03-15 VITALS
OXYGEN SATURATION: 100 % | HEART RATE: 64 BPM | DIASTOLIC BLOOD PRESSURE: 79 MMHG | SYSTOLIC BLOOD PRESSURE: 113 MMHG | TEMPERATURE: 97.5 F | RESPIRATION RATE: 18 BRPM

## 2024-03-15 DIAGNOSIS — D3A.8 NEUROENDOCRINE TUMOR OF ANUS: ICD-10-CM

## 2024-03-15 PROCEDURE — 88305 TISSUE EXAM BY PATHOLOGIST: CPT | Performed by: PATHOLOGY

## 2024-03-15 PROCEDURE — 88360 TUMOR IMMUNOHISTOCHEM/MANUAL: CPT | Performed by: PATHOLOGY

## 2024-03-15 PROCEDURE — 88342 IMHCHEM/IMCYTCHM 1ST ANTB: CPT | Performed by: PATHOLOGY

## 2024-03-15 PROCEDURE — 45385 COLONOSCOPY W/LESION REMOVAL: CPT | Performed by: INTERNAL MEDICINE

## 2024-03-15 PROCEDURE — 88341 IMHCHEM/IMCYTCHM EA ADD ANTB: CPT | Performed by: PATHOLOGY

## 2024-03-15 RX ORDER — SODIUM CHLORIDE 9 MG/ML
125 INJECTION, SOLUTION INTRAVENOUS CONTINUOUS
Status: DISCONTINUED | OUTPATIENT
Start: 2024-03-15 | End: 2024-03-19 | Stop reason: HOSPADM

## 2024-03-15 RX ORDER — PROPOFOL 10 MG/ML
INJECTION, EMULSION INTRAVENOUS AS NEEDED
Status: DISCONTINUED | OUTPATIENT
Start: 2024-03-15 | End: 2024-03-15

## 2024-03-15 RX ADMIN — PROPOFOL 20 MG: 10 INJECTION, EMULSION INTRAVENOUS at 10:37

## 2024-03-15 RX ADMIN — PROPOFOL 40 MG: 10 INJECTION, EMULSION INTRAVENOUS at 10:32

## 2024-03-15 RX ADMIN — SODIUM CHLORIDE 125 ML/HR: 0.9 INJECTION, SOLUTION INTRAVENOUS at 09:36

## 2024-03-15 RX ADMIN — PROPOFOL 20 MG: 10 INJECTION, EMULSION INTRAVENOUS at 10:39

## 2024-03-15 RX ADMIN — SODIUM CHLORIDE: 0.9 INJECTION, SOLUTION INTRAVENOUS at 10:40

## 2024-03-15 RX ADMIN — PROPOFOL 40 MG: 10 INJECTION, EMULSION INTRAVENOUS at 10:35

## 2024-03-15 RX ADMIN — PROPOFOL 40 MG: 10 INJECTION, EMULSION INTRAVENOUS at 10:29

## 2024-03-15 RX ADMIN — PROPOFOL 130 MG: 10 INJECTION, EMULSION INTRAVENOUS at 10:27

## 2024-03-15 NOTE — H&P
History and Physical -  [facility]  Colin Blair 59 y.o. male MRN: [id number]                  HPI: Colin Blair is a 59 y.o. year old male who presents for history of polyps.      REVIEW OF SYSTEMS: Per the HPI, and otherwise unremarkable.    Historical Information   Past Medical History:   Diagnosis Date    Colon polyp     History of colon polyps     Paronychia of left thumb 05/02/2019    Partial tear of right rotator cuff 01/31/2018     Past Surgical History:   Procedure Laterality Date    COLONOSCOPY      OH COLONOSCOPY FLX DX W/COLLJ SPEC WHEN PFRMD N/A 4/13/2018    Procedure: COLONOSCOPY;  Surgeon: Obdulio Miller MD;  Location: [facility];  Service: Gastroenterology     Social History   Social History     Substance and Sexual Activity   Alcohol Use Not Currently    Comment: Socially     Social History     Substance and Sexual Activity   Drug Use No     Social History     Tobacco Use   Smoking Status Never    Passive exposure: Past (Socially)   Smokeless Tobacco Never     Family History   Problem Relation Age of Onset    Thyroid cancer Mother     Breast cancer Sister        Meds/Allergies     (Not in a hospital admission)      Allergies   Allergen Reactions    Shrimp (Diagnostic) - Food Allergy Hives and Rash       Objective     Blood pressure 108/72, pulse 66, temperature 97.5 °F (36.4 °C), temperature source Temporal, resp. rate 20, SpO2 99%.      PHYSICAL EXAMINATION:    General Appearance:   Alert, cooperative, no distress   HEENT:  Normocephalic, atraumatic, anicteric. Neck supple, symmetrical, trachea midline.   Lungs:   Equal chest rise and unlabored breathing, normal effort, no coughing.   Cardiovascular:   No visualized JVD.   Abdomen:   No abdominal distension.   Skin:   No jaundice, rashes, or lesions.    Musculoskeletal:   Normal range of motion visualized.   Psych:  Normal affect and normal insight.   Neuro:  Alert and appropriate.           ASSESSMENT/PLAN:  This is a 59  y.o. year old male here for colonoscopy, and he is stable and optimized for his procedure.

## 2024-03-15 NOTE — ANESTHESIA PREPROCEDURE EVALUATION
Procedure:  COLONOSCOPY    Relevant Problems   No relevant active problems        Physical Exam    Airway    Mallampati score: II  TM Distance: >3 FB       Dental   No notable dental hx     Cardiovascular  Cardiovascular exam normal    Pulmonary  Pulmonary exam normal     Other Findings        Anesthesia Plan  ASA Score- 2     Anesthesia Type- IV sedation with anesthesia with ASA Monitors.         Additional Monitors:     Airway Plan:            Plan Factors-Exercise tolerance (METS): >4 METS.    Chart reviewed.        Patient is not a current smoker.  Patient instructed to abstain from smoking on day of procedure. Patient did not smoke on day of surgery.            Induction- intravenous.    Postoperative Plan-     Informed Consent- Anesthetic plan and risks discussed with patient.

## 2024-03-19 PROCEDURE — 88342 IMHCHEM/IMCYTCHM 1ST ANTB: CPT | Performed by: PATHOLOGY

## 2024-03-19 PROCEDURE — 88360 TUMOR IMMUNOHISTOCHEM/MANUAL: CPT | Performed by: PATHOLOGY

## 2024-03-19 PROCEDURE — 88305 TISSUE EXAM BY PATHOLOGIST: CPT | Performed by: PATHOLOGY

## 2024-03-19 PROCEDURE — 88341 IMHCHEM/IMCYTCHM EA ADD ANTB: CPT | Performed by: PATHOLOGY

## 2024-03-22 ENCOUNTER — NURSE TRIAGE (OUTPATIENT)
Age: 59
End: 2024-03-22

## 2024-03-22 NOTE — TELEPHONE ENCOUNTER
"Pt following up on conversation with Dr. Miller earlier this week. States provider was going to order another procedure at [facility] for tumor. Advised will forward to provider and office will reach out. Pt verbalized understanding.    Pt can be reached at [phone]   Answer Assessment - Initial Assessment Questions  1. REASON FOR CALL or QUESTION: \"What is your reason for calling today?\" or \"How can I best help you?\" or \"What question do you have that I can help answer?\"      Following on on procedure scheduling    Protocols used: Information Only Call - No Triage-ADULT-[place]    "

## 2024-03-27 NOTE — TELEPHONE ENCOUNTER
Hi,    I am covering for Dr. Davison.  I do not see the conversation documented.  I will forward to Dr. Davison for his return.  I suspect it was for a rectal EUS but I am not sure how soon he wanted the patient to get it done.    Thank you!

## 2024-03-29 NOTE — TELEPHONE ENCOUNTER
"Pt calling in, reports he just miss a call from \"Jane Marsh\" and would like a call back today- he is working but is available to speak now if she can call   "

## 2024-05-16 NOTE — ANESTHESIA POSTPROCEDURE EVALUATION
From: Danae Fong  To: Amos Lee  Sent: 5/15/2024 1:23 PM EDT  Subject: Labs    I had my labs ordered by Dr Lee drawn on May 7 and have been checking for results. I have not seen any but did notice that one of the orders is still on My Chart. I was just wondering if there was a problem with the specimen? I have my ultrasound scheduled for June 2. Thanks   Post-Op Assessment Note    CV Status:  Stable    Pain management: adequate       Mental Status:  Alert and awake   Hydration Status:  Euvolemic   PONV Controlled:  Controlled   Airway Patency:  Patent     Post Op Vitals Reviewed: Yes    No anethesia notable event occurred.                BP      Temp      Pulse     Resp      SpO2      /79   Pulse 64   Temp 97.5 °F (36.4 °C) (Temporal)   Resp 18   SpO2 100%